# Patient Record
Sex: FEMALE | Race: WHITE | NOT HISPANIC OR LATINO
[De-identification: names, ages, dates, MRNs, and addresses within clinical notes are randomized per-mention and may not be internally consistent; named-entity substitution may affect disease eponyms.]

---

## 2022-11-15 PROBLEM — Z00.00 ENCOUNTER FOR PREVENTIVE HEALTH EXAMINATION: Status: ACTIVE | Noted: 2022-11-15

## 2022-11-16 ENCOUNTER — APPOINTMENT (OUTPATIENT)
Dept: ORTHOPEDIC SURGERY | Facility: CLINIC | Age: 57
End: 2022-11-16

## 2022-11-16 VITALS — HEIGHT: 64 IN | WEIGHT: 190 LBS | BODY MASS INDEX: 32.44 KG/M2

## 2022-11-16 DIAGNOSIS — Z78.9 OTHER SPECIFIED HEALTH STATUS: ICD-10-CM

## 2022-11-16 DIAGNOSIS — Z72.3 LACK OF PHYSICAL EXERCISE: ICD-10-CM

## 2022-11-16 DIAGNOSIS — Z60.2 PROBLEMS RELATED TO LIVING ALONE: ICD-10-CM

## 2022-11-16 PROCEDURE — 99203 OFFICE O/P NEW LOW 30 MIN: CPT | Mod: 25

## 2022-11-16 PROCEDURE — 73564 X-RAY EXAM KNEE 4 OR MORE: CPT | Mod: RT

## 2022-11-16 PROCEDURE — 20610 DRAIN/INJ JOINT/BURSA W/O US: CPT | Mod: 59,RT

## 2022-11-16 RX ORDER — AMOXICILLIN 875 MG/1
875 TABLET, FILM COATED ORAL
Qty: 14 | Refills: 0 | Status: COMPLETED | COMMUNITY
Start: 2022-09-27

## 2022-11-16 RX ORDER — IBUPROFEN 800 MG/1
800 TABLET, FILM COATED ORAL
Qty: 21 | Refills: 0 | Status: COMPLETED | COMMUNITY
Start: 2022-09-27

## 2022-11-16 RX ORDER — BENZONATATE 200 MG/1
200 CAPSULE ORAL
Qty: 30 | Refills: 0 | Status: COMPLETED | COMMUNITY
Start: 2022-11-01

## 2022-11-16 RX ORDER — AZITHROMYCIN 250 MG/1
250 TABLET, FILM COATED ORAL
Qty: 6 | Refills: 0 | Status: COMPLETED | COMMUNITY
Start: 2022-11-01

## 2022-11-16 SDOH — SOCIAL STABILITY - SOCIAL INSECURITY: PROBLEMS RELATED TO LIVING ALONE: Z60.2

## 2022-11-16 NOTE — PHYSICAL EXAM
[LE] : Sensory: Intact in bilateral lower extremities [Normal RLE] : Right Lower Extremity: No scars, rashes, lesions, ulcers, skin intact [Normal LLE] : Left Lower Extremity: No scars, rashes, lesions, ulcers, skin intact [Normal Touch] : sensation intact for touch [Normal] : Oriented to person, place, and time, insight and judgement were intact and the affect was normal [Slightly Antalgic] : slightly antalgic [de-identified] : RIGHT  knee is left for comparison\par Slightly antalgic gait.\par It hurts if she extends her knee fully.\par Trace effusion right knee.  No erythema or ecchymoses.\par Right knee range of motion is with a few degree flexion contracture with pain on attempted full extension and flexion to about 130 degrees with pain.  On the left there is motion from 0 to 135 degrees.\par Tender right knee medial joint line and mildly patella facet.\par Ia Lachman.\par Positive Diaz.\par Negative anterior and posterior drawer.  Normal varus and valgus laxity.\par Normal neurovascular exam [de-identified] : \par \par X-rays taken today of RIGHT knee weightbearing 4 views show mild medial joint space narrowing and small osteophytes consistent with mild to moderate medial compartment arthrosis and mild degenerative changes patellofemoral joint.  KL 2

## 2022-11-16 NOTE — REVIEW OF SYSTEMS
[Negative] : Heme/Lymph [Joint Pain] : joint pain [Joint Stiffness] : joint stiffness [Feeling Tired] : fatigue [Recent Weight Gain (___ Lbs)] : recent ~M [unfilled] lbs weight gain [Anxiety] : anxiety [Muscle Weakness] : muscle weakness

## 2022-11-16 NOTE — HISTORY OF PRESENT ILLNESS
[de-identified] : Ms. Bradley is a 56 y/o woman who comes in for evaluation for RIGHT knee pain that started a few years ago when she kneeled on her bed and felt a painless snap in the back of her knee. She has had some ongoing swelling in back of knee but was never very painful until a couple days ago. She has pain with full extension and cant get it all the way straight when walking. She has been icing. Her pain is located in the back of her knee and it can radiate down into her calf. 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 \par She has a history of LEFT meniscus surgery 14 years ago.  The left knee has done well without any problems.

## 2022-11-16 NOTE — PROCEDURE
[Injection] : Injection [Right] : of the right [Knee Joint] : knee joint [Effusion] : Effusion [Osteoarthritis] : Osteoarthritis [Patient] : patient [Risk] : risk [Benefits] : benefits [Alternatives] : alternatives [Bleeding] : bleeding [Infection] : infection [Allergic Reaction] : allergic reaction [Verbal Consent Obtained] : verbal consent was obtained prior to the procedure [Alcohol] : Alcohol [Ethyl Chloride Spray] : ethyl chloride spray was used as a topical anesthetic [Lateral] : lateral [20] : a 20-gauge [Same Needle/New Syringe] : the syringe was changed and the same needle was left in place and [1% Lidocaine___(mL)] : [unfilled] mL of 1% Lidocaine [Triamcinolone 40mg/mL___(mL)] : [unfilled] ~UmL of 40mg/mL triamcinolone [Bandage Applied] : a bandage [Tolerated Well] : The patient tolerated the procedure well [None] : none [No Strenuous Activity___day(s)] : avoid strenuous activity for [unfilled] day(s) [PRN] : as needed [FreeTextEntry1] : ChloraPrep

## 2022-11-16 NOTE — ASSESSMENT
[FreeTextEntry1] : 56 y/o female with RIGHT knee pain consistent with medial compartment osteoarthritis and likely has a degenerative medial meniscus tear given the acute pain.  We discussed the diagnosis and treatment.  Recommended taking an anti-inflammatory.  Ibuprofen was prescribed that she can take 1 or 2 times a day and we decided to do a steroid injection because she was hoping to get more immediate relief since this is her busy season with work.\par She will do home exercises and physical therapy.\par If its not better in the next 6 to 8 weeks she will come in for follow-up or follow-up as needed.  If it continues to hurt we may get an MRI or work-up further.  If there was a significant meniscus tear causing pain then arthroscopy may be considered but often degenerative tears with arthritis can be managed without surgery.  For the arthritis we may also consider hyaluronic acid injection.

## 2023-02-03 ENCOUNTER — APPOINTMENT (OUTPATIENT)
Dept: ORTHOPEDIC SURGERY | Facility: CLINIC | Age: 58
End: 2023-02-03
Payer: COMMERCIAL

## 2023-02-03 PROCEDURE — 20610 DRAIN/INJ JOINT/BURSA W/O US: CPT | Mod: 59,RT

## 2023-02-03 PROCEDURE — 99214 OFFICE O/P EST MOD 30 MIN: CPT | Mod: 25

## 2023-02-03 NOTE — HISTORY OF PRESENT ILLNESS
[de-identified] : Ms. Bradley is a 56 y/o woman who comes in for f/u evaluation for RIGHT knee pain that started a few years ago when she kneeled on her bed and felt a painless snap in the back of her knee. She had the MRI done and comes in to review today.  She states that her knee is feeling tight and sore.  She never went to physical therapy but did some home exercises.  She is on her feet all day.  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  Epsom salt baths are helpful.  She does rest and ice as well.  She takes ibuprofen intermittently.

## 2023-02-03 NOTE — PHYSICAL EXAM
[Slightly Antalgic] : slightly antalgic [LE] : Sensory: Intact in bilateral lower extremities [Normal RLE] : Right Lower Extremity: No scars, rashes, lesions, ulcers, skin intact [Normal LLE] : Left Lower Extremity: No scars, rashes, lesions, ulcers, skin intact [Normal Touch] : sensation intact for touch [Normal] : Oriented to person, place, and time, insight and judgement were intact and the affect was normal [de-identified] : RIGHT  knee is left for comparison\par Slightly antalgic gait.\par It hurts if she extends her knee fully.  She cannot fully extend the knee\par Trace effusion right knee.  No erythema or ecchymoses.\par 1+ effusion right knee.  Posteriorly there is a palpable Baker's cyst\par Right knee range of motion is with a 5 degree flexion contracture with pain on attempted full extension and flexion to about 130 degrees with pain.  On the left there is motion from 0 to 135 degrees.\par Tender right knee medial joint line and mildly patella facet.\par Ia Lachman.\par Positive Diaz.\par Negative anterior and posterior drawer.  Normal varus and valgus laxity.\par Normal neurovascular exam [de-identified] : \par 11/16/2022 x-rays of RIGHT knee weightbearing 4 views show mild medial joint space narrowing and small osteophytes consistent with mild to moderate medial compartment arthrosis and mild degenerative changes patellofemoral joint.  KL 2\par \par MRI of the right knee performed at [facility] on 1/31/2023 shows large Baker's cyst, chondral wear medial compartment and extrusion of the degenerative medial meniscus and bone bruising likely degenerative in the far medial tibial plateau

## 2023-02-03 NOTE — PROCEDURE
[Aspiration] : Aspiration [Injection] : Injection [Right] : of the right [Knee Joint] : knee joint [Patient] : patient [Risk] : risk [Benefits] : benefits [Alternatives] : alternatives [Bleeding] : bleeding [Infection] : infection [Allergic Reaction] : allergic reaction [Verbal Consent Obtained] : verbal consent was obtained prior to the procedure [Alcohol] : Alcohol [Ethyl Chloride Spray] : ethyl chloride spray was used as a topical anesthetic [Same Needle/New Syringe] : the syringe was changed and the same needle was left in place and [Bandage Applied] : a bandage [Tolerated Well] : The patient tolerated the procedure well [None] : none [No Strenuous Activity___day(s)] : avoid strenuous activity for [unfilled] day(s) [___ mL Fluid] : [unfilled] mL of [Yellow] : yellow [Clear] : clear [Triamcinolone 40mg/mL___(mL)] : [unfilled] ~UmL of 40mg/mL triamcinolone [Inflammation] : Inflammation [Joint Pain] : Joint pain [20] : a 20-gauge [PRN] : as needed [de-identified] : Baker's cyst [FreeTextEntry1] : ChloraPrep [de-identified] : Lateral to the patella for the aspiration of the knee and posteriorly directly over the Baker's cyst [de-identified] : Each site [FreeTextEntry9] : Posteriorly and Baker's cyst

## 2023-02-03 NOTE — ASSESSMENT
[FreeTextEntry1] : 56 y/o female with RIGHT knee pain consistent with moderately severe medial compartment osteoarthritis and a degenerative medial meniscus tear.  With the degree of arthritis and swelling I would not recommend arthroscopy at this point.  We talked about trying an aspiration of the knee joint and then aspirating the Baker's cyst and doing a steroid injection in the cyst.  Her pain is more posterior.  Following the aspirations her knee felt significantly better with less pressure and stiffness.\par We will see if it gives her longer term relief.  She can take ibuprofen and ice in the meantime.  She will go to physical therapy and do home exercises.\par Follow-up in about 6 weeks to check on her progress.\par The arthritis typically will gradually get worse over time and at some point in the future could be bad enough that she would consider doing knee replacement surgery

## 2023-03-08 ENCOUNTER — APPOINTMENT (OUTPATIENT)
Dept: ORTHOPEDIC SURGERY | Facility: CLINIC | Age: 58
End: 2023-03-08
Payer: SELF-PAY

## 2023-03-08 DIAGNOSIS — M71.21 SYNOVIAL CYST OF POPLITEAL SPACE [BAKER], RIGHT KNEE: ICD-10-CM

## 2023-03-08 PROCEDURE — 99214 OFFICE O/P EST MOD 30 MIN: CPT

## 2023-03-08 RX ORDER — HYALURONATE SODIUM, STABILIZED 88 MG/4 ML
88 SYRINGE (ML) INTRAARTICULAR
Qty: 1 | Refills: 0 | Status: ACTIVE | OUTPATIENT
Start: 2023-03-08

## 2023-03-08 NOTE — PHYSICAL EXAM
[Slightly Antalgic] : slightly antalgic [LE] : Sensory: Intact in bilateral lower extremities [Normal RLE] : Right Lower Extremity: No scars, rashes, lesions, ulcers, skin intact [Normal LLE] : Left Lower Extremity: No scars, rashes, lesions, ulcers, skin intact [Normal Touch] : sensation intact for touch [Normal] : Oriented to person, place, and time, insight and judgement were intact and the affect was normal [de-identified] : RIGHT  knee is left for comparison\par Slightly antalgic gait.\par Trace effusion right knee.  No erythema or ecchymoses.\par No palpable cyst posterior knee\par Right knee range of motion is with a 5 degree flexion contracture with pain on attempted full extension and flexion to about 130 degrees with pain.  On the left there is motion from 0 to 135 degrees.\par Tender right knee medial joint line and mildly patella facet.\par Ia Lachman.\par Positive Diaz.\par Negative anterior and posterior drawer.  Normal varus and valgus laxity.\par Normal neurovascular exam [de-identified] : \par 11/16/2022 x-rays of RIGHT knee weightbearing 4 views show mild medial joint space narrowing and small osteophytes consistent with mild to moderate medial compartment arthrosis and mild degenerative changes patellofemoral joint.  KL 2\par \par MRI of the right knee performed at [facility] on 1/31/2023 shows large Baker's cyst, chondral wear medial compartment and extrusion of the degenerative medial meniscus and bone bruising likely degenerative in the far medial tibial plateau

## 2023-03-08 NOTE — HISTORY OF PRESENT ILLNESS
[de-identified] : Ms. Bradley is a 58 y/o woman who comes in for f/u evaluation for RIGHT knee pain from osteoarthritis.\par We had done a steroid injection which partially helped but she still has some pain and stiffness and difficulty with deep bending.  She is walking with less pain and the swelling will decrease but did not resolve.  She has been taking the 600 mg ibuprofen and ran out of that.  She has been doing a home exercise program for the knee that we had given her in November 4 months ago consistently.  She has not been able to find a physical therapy facility that takes her insurance but may go out of network instead to do some formal therapy but has been good about doing her exercises.

## 2023-03-08 NOTE — ASSESSMENT
[FreeTextEntry1] : 56 y/o female with RIGHT knee pain consistent with moderately severe medial compartment osteoarthritis and a degenerative medial meniscus tear.  There is still small effusion in the knee but has decreased and steroid injection was partially helpful.  She has been doing the exercises and taking the NSAIDs for months now.  Given the ongoing pain we talked about hyaluronic acid injection and I put in a prescription to try to get Monovisc which is one of the brands.  We talked about the pros and cons and she would like to try that.  She will continue with all of the other treatment including NSAIDs as needed but avoiding too much which could affect her kidneys or cause an ulcer since she has been on it for many months now.  She will continue with the home exercises for the knee.  Follow-up for the hyaluronic acid injection when we get it.

## 2023-03-09 RX ORDER — IBUPROFEN 600 MG/1
600 TABLET, FILM COATED ORAL TWICE DAILY
Qty: 60 | Refills: 0 | Status: ACTIVE | COMMUNITY
Start: 2022-11-16 | End: 1900-01-01

## 2023-04-04 ENCOUNTER — NON-APPOINTMENT (OUTPATIENT)
Age: 58
End: 2023-04-04

## 2023-06-05 ENCOUNTER — APPOINTMENT (OUTPATIENT)
Dept: ORTHOPEDIC SURGERY | Facility: CLINIC | Age: 58
End: 2023-06-05

## 2023-06-07 ENCOUNTER — RX RENEWAL (OUTPATIENT)
Age: 58
End: 2023-06-07

## 2023-06-07 ENCOUNTER — OUTPATIENT (OUTPATIENT)
Dept: OUTPATIENT SERVICES | Facility: HOSPITAL | Age: 58
LOS: 1 days | End: 2023-06-07
Payer: MEDICAID

## 2023-06-07 ENCOUNTER — RESULT REVIEW (OUTPATIENT)
Age: 58
End: 2023-06-07

## 2023-06-07 ENCOUNTER — APPOINTMENT (OUTPATIENT)
Dept: ORTHOPEDIC SURGERY | Facility: CLINIC | Age: 58
End: 2023-06-07
Payer: MEDICAID

## 2023-06-07 VITALS
HEIGHT: 64 IN | SYSTOLIC BLOOD PRESSURE: 143 MMHG | OXYGEN SATURATION: 97 % | BODY MASS INDEX: 30.73 KG/M2 | DIASTOLIC BLOOD PRESSURE: 79 MMHG | WEIGHT: 180 LBS | HEART RATE: 62 BPM

## 2023-06-07 DIAGNOSIS — S83.241A OTHER TEAR OF MEDIAL MENISCUS, CURRENT INJURY, RIGHT KNEE, INITIAL ENCOUNTER: ICD-10-CM

## 2023-06-07 DIAGNOSIS — M17.11 UNILATERAL PRIMARY OSTEOARTHRITIS, RIGHT KNEE: ICD-10-CM

## 2023-06-07 DIAGNOSIS — Z82.61 FAMILY HISTORY OF ARTHRITIS: ICD-10-CM

## 2023-06-07 DIAGNOSIS — Z87.39 PERSONAL HISTORY OF OTHER DISEASES OF THE MUSCULOSKELETAL SYSTEM AND CONNECTIVE TISSUE: ICD-10-CM

## 2023-06-07 DIAGNOSIS — M25.461 EFFUSION, RIGHT KNEE: ICD-10-CM

## 2023-06-07 PROCEDURE — 72170 X-RAY EXAM OF PELVIS: CPT | Mod: 26

## 2023-06-07 PROCEDURE — 99214 OFFICE O/P EST MOD 30 MIN: CPT | Mod: 25

## 2023-06-07 PROCEDURE — 20610 DRAIN/INJ JOINT/BURSA W/O US: CPT

## 2023-06-07 PROCEDURE — 73564 X-RAY EXAM KNEE 4 OR MORE: CPT

## 2023-06-07 PROCEDURE — 72170 X-RAY EXAM OF PELVIS: CPT

## 2023-06-07 PROCEDURE — 73564 X-RAY EXAM KNEE 4 OR MORE: CPT | Mod: 26,50

## 2023-06-07 RX ORDER — HYALURONATE SODIUM 20 MG/2 ML
20 SYRINGE (ML) INTRAARTICULAR
Qty: 1 | Refills: 0 | Status: ACTIVE | OUTPATIENT
Start: 2023-06-07

## 2023-06-07 NOTE — REVIEW OF SYSTEMS
[Joint Pain] : joint pain [Joint Stiffness] : joint stiffness [Joint Swelling] : joint swelling [Feeling Tired] : fatigue [Recent Weight Gain (___ Lbs)] : recent ~M [unfilled] lbs weight gain [Heartburn] : heartburn [Depression] : depression [Feeling Weak] : feeling weak [Negative] : Heme/Lymph

## 2023-06-08 PROBLEM — Z87.39 HISTORY OF OSTEOPOROSIS: Status: RESOLVED | Noted: 2023-06-07 | Resolved: 2023-06-08

## 2023-06-08 PROBLEM — Z82.61 FAMILY HISTORY OF ARTHRITIS: Status: ACTIVE | Noted: 2023-06-07

## 2023-06-08 PROBLEM — Z87.39 HISTORY OF ARTHRITIS: Status: RESOLVED | Noted: 2023-06-07 | Resolved: 2023-06-08

## 2023-06-08 RX ORDER — SERTRALINE HYDROCHLORIDE 25 MG/1
TABLET, FILM COATED ORAL
Refills: 0 | Status: ACTIVE | COMMUNITY

## 2023-06-08 NOTE — DISCUSSION/SUMMARY
[de-identified] : 56 y/o female with right greater than left knee OA. \par - We discussed the diagnosis, natural history, and treatment options ranging from fully conservative to invasive. Regarding surgical options, I think that she would be a reasonable candidate for both total knee replacement or medial unicompartmental knee replacement.\par - The relative merits of partial and total knee replacement were discussed in detail. The patient was informed that partial knee replacement preserves bone and cartilage as compared to total knee replacement and maintains the function of the anterior cruciate ligament. In appropriately selected patients, partial knee replacement can result in a faster, less painful recovery and a more "normal" feel to the knee with improved function in deep flexion activities. The patient was also informed that the published 10 year reoperation rate is somewhat higher for partial knee replacement, with fixation failure, progression of disease affecting un-resurfaced portions of the joint, polyethylene wear and unexplained pain being the most common causes of revision surgery. The patient was informed that conversion of partial knee replacement to total knee replacement is typically more complex than primary total knee replacement but less complex and invasive than revision of total knee replacement. We discussed the details of both procedures, the expected recovery periods, and the expected outcomes. We discussed the likelihood of satisfaction after complete recovery, and the potential causes of dissatisfaction. The importance of active patient participation in the rehabilitation protocol was emphasized, along with its influence on short and long-term outcomes.  We discussed the possibility that intra-operative findings would dictate conversion to total knee replacement, and the influence that change would have on recovery and long term outcome. Specific risks of partial and total knee replacement were discussed in detail. We discussed the risk of surgical site complications including but not limited to: surgical site infection, wound healing complications, bone fracture, tendon or ligament injury, neurovascular injury, hemorrhage, postoperative stiffness or instability, persistent pain and need for reoperation or manipulation under anesthesia. We discussed surgical blood loss and the small risk of requiring blood transfusion. We discussed the risk of perioperative medical complications, including but not limited to catheter-associated urinary tract infection, venous thromboembolism and other cardiopulmonary complications. We discussed anesthetic options and the small risk of anesthesia-related complications. We discussed the durability of knee replacements and limitations related to progression of arthritis in remaining compartments, polyethylene wear, osteolysis, loosening and unexplained pain. The role of the robot was discussed with respect to bone preparation and ligament balancing.\par - After full discussion, I think that it is also reasonable for her to continue with nonsurgical care given the current relatively mild level of her symptoms, with which she agrees. We will continue nonsurgically for now. \par - She was given a referral to outpatient PT as well as a HEP. I recommended that she use Tylenol and meloxicam as needed. I administered a right knee aspiration and injection today and we will attempt to gain authorization for right knee Euflexxa via the Euflexxa assistance program to be administered once authorized. \par - If the above measures are effective, then we can continue with serial injections for as long as they remain effective. If not, then we can revisit the topic regarding right knee arthroplasty in the future. \par - We also had a specific discussion regarding the Baker's cyst. I explained that the cyst appears to be multiloculated and already partially ruptured on the January MRI and furthermore, I noted that Baker's cysts typically do not generate symptoms themselves except in the case of a rupture which has already happened. I recommended against any Baker's cyst aspiration and against any operative excision. The patient verbalized understanding and agreement.

## 2023-06-08 NOTE — ADDENDUM
[FreeTextEntry1] : Documented by Sarah Owens acting as a scribe for Dr. Yair Padilla on 06/07/2023.

## 2023-06-08 NOTE — PHYSICAL EXAM
[de-identified] :  General appearance: well nourished and hydrated, pleasant, alert and oriented x 3, cooperative.  \par HEENT: normocephalic, EOM intact, wearing mask, external auditory canal clear.  \par Cardiovascular: no lower leg edema, no varicosities, dorsalis pedis pulses palpable and symmetric.  \par Lymphatics: no palpable lymphadenopathy, no lymphedema.  \par Neurologic: sensation is normal, no muscle weakness in upper or lower extremities, patella tendon reflexes present and symmetric.  \par Dermatologic: skin moist, warm, no rash.  \par Spine: cervical spine with normal lordosis and painless range of motion, thoracic spine with normal kyphosis and painless range of motion, lumbosacral spine with normal lordosis and painless range of motion.  No tenderness to palpation along midline spine and paraspinal musculature.  Sacroiliac joints nontender bilaterally. Negative SLR and crossed SLR tests bilaterally.\par Gait: no assistive device, stable nonantalgic gait pattern \par \par Left knee: \par - Focal soft tissue swelling: none\par - Ecchymosis: none\par - Erythema: none\par - Effusion: none, no Baker's cyst\par - Wounds: healed arthroscopic portals\par - Alignment: normal\par - Tenderness: none to medial or lateral joint lines, negative patellar compression test\par - ROM: 0-140\par - Collateral laxity: none\par - Cruciate laxity: none\par - Popliteal angle (degrees): 30\par - Quad strength: 5/5\par \par Right knee:\par - Focal soft tissue swelling: none\par - Ecchymosis: none\par - Erythema: none\par - Effusion: small, small Baker's cyst\par - Wounds: none\par - Alignment: normal\par - Tenderness: TTP along the lateral joint line and medial joint line, negative patellar compression test\par - ROM: 0-135 with pain on terminal flexion\par - Collateral laxity: none\par - Cruciate laxity: none\par - Popliteal angle (degrees): 30\par - Quad strength: 5/5 [de-identified] : AP pelvis and 4 views of the bilateral knees (weightbearing AP, weightbearing Peña, weightbearing lateral, and Sunrise) were interpreted by me and reviewed with the patient.\par \par Location of imaging: [facility] \par Date of exam: 06/07/2023\par \par Pelvic alignment: normal\par \par Right hip -- \par Arthritis: none\par Deformity: none\par Osteonecrosis: none\par \par Left hip -- \par Arthritis: none\par Deformity: none\par Osteonecrosis: none\par \par Right knee -- \par Alignment: normal\par Arthritis: severe isolated medial compartment OA with bone on bone articulation, KL 4\par Patellar height: normal\par Patellar tracking: central\par \par Left knee -- \par Alignment: normal\par Arthritis: mild medial compartment joint space narrowing with associated osteophyte formation, KL 2\par Patellar height: normal\par Patellar tracking: central\par \par I also reviewed a right knee MRI taken at [facility] on 1/21/23, the results of which are significant for intact ACL and PCL. Tricompartmental OA most pronounced medially with associated complex medial meniscal tear is present with intact lateral meniscus, small effusion, and a multiloculated Baker's cyst with evidence of partial rupture.

## 2023-06-08 NOTE — PROCEDURE
[de-identified] : Procedure: Knee joint aspiration/injection\par Laterality: right\par Indication: Osteoarthritis - discussed with patient\par Skin prep: alcohol and chlorhexidine\par Anesthesia: ethyl chloride spray\par Needle: 18-gauge\par Portal: superolateral\par Aspiration: 0 cc (dry tap)\par Injectate: 2cc of 2% lidocaine, 2cc of 0.5% bupivacaine, and 1cc of 40mg/mL triamcinolone\par Dressing: Band-aid\par Complications: None

## 2023-06-08 NOTE — HISTORY OF PRESENT ILLNESS
[4] : a current pain level of 4/10 [Constant] : ~He/She~ states the symptoms seem to be constant [Walking] : worsened by walking [Knee Flexion] : worsened with knee flexion [Knee Extension] : worsened with knee extension [NSAIDs] : relieved by nonsteroidal anti-inflammatory drugs [de-identified] : 06/07/2023: 58 y/o female referred by Dr. Bailey for evaluation of right knee OA. She reports a history of approximately 5-6 months of progressively worsening right knee pain after a relatively minor injury. She reports primarily medial joint line pain with a lesser focus of suprapatellar pain. Symptoms are exacerbated by any weightbearing and ambulation but particularly by ascending and descending stairs as well as squatting and kneeling. She denies any limitations or ambulatory tolerance and ambulates without an assistive device. 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 She notes that since she left that position, symptoms have been somewhat better, but she complains of inability to perform basic activities such as managing stairs and kneeling. She takes Advil daily as needed. Other conservative management measures attempted in the past include acupuncture, right knee aspiration and CSI by Dr. Bailey in February 2023, and PT at various time points. She has a history of left knee arthroscopy for a meniscal tear in 2006 by Dr. Bailey. She denies prior surgeries to the right knee.  [de-identified] : pt states pain is sharp

## 2023-06-08 NOTE — END OF VISIT
[FreeTextEntry3] : All medical record entries made by the Scribe were at my, Dr. Yair Padilla, direction and personally dictated by me on 06/07/2023. I have reviewed the chart and agree that the record accurately reflects my personal performance of the history, physical exam, assessment and plan. I have also personally directed, reviewed, and agreed with the chart.

## 2023-07-12 ENCOUNTER — APPOINTMENT (OUTPATIENT)
Dept: ORTHOPEDIC SURGERY | Facility: CLINIC | Age: 58
End: 2023-07-12
Payer: MEDICAID

## 2023-07-12 VITALS
BODY MASS INDEX: 30.73 KG/M2 | DIASTOLIC BLOOD PRESSURE: 85 MMHG | OXYGEN SATURATION: 96 % | SYSTOLIC BLOOD PRESSURE: 134 MMHG | HEIGHT: 64 IN | HEART RATE: 80 BPM | WEIGHT: 180 LBS

## 2023-07-12 PROCEDURE — 20610 DRAIN/INJ JOINT/BURSA W/O US: CPT

## 2023-07-14 NOTE — PROCEDURE
[de-identified] : EUFLEXXA INJECTION - RIGHT KNEE JOINT  1 OF 3 \par LOT - [id number]\par EXP- 2024-05-14\par MAN - GEOFF \par NDC- [address]-6\par \par Procedure: Knee joint injection\par Laterality: Right\par Indication: Osteoarthritis - discussed with patient\par Skin prep: alcohol and chlorhexidine\par Anesthesia: ethyl chloride spray\par Needle: 20-gauge\par Portal: inferolateral\par Aspiration: none\par Injectate: Euflexxa #1 of 3\par Dressing: Band-aid\par Complications: None\par \par RTC next week for Euflexxa #2 of 3 to the right knee.

## 2023-07-19 ENCOUNTER — APPOINTMENT (OUTPATIENT)
Dept: ORTHOPEDIC SURGERY | Facility: CLINIC | Age: 58
End: 2023-07-19
Payer: MEDICAID

## 2023-07-19 VITALS
OXYGEN SATURATION: 93 % | HEIGHT: 64 IN | BODY MASS INDEX: 30.73 KG/M2 | WEIGHT: 180 LBS | HEART RATE: 75 BPM | SYSTOLIC BLOOD PRESSURE: 146 MMHG | DIASTOLIC BLOOD PRESSURE: 92 MMHG

## 2023-07-19 PROCEDURE — 20610 DRAIN/INJ JOINT/BURSA W/O US: CPT | Mod: 50

## 2023-07-20 NOTE — PROCEDURE
[de-identified] : Procedure: Knee joint injection\par Laterality: Right\par Indication: Osteoarthritis - discussed with patient\par Skin prep: alcohol and chlorhexidine\par Anesthesia: ethyl chloride spray\par Needle: 20-gauge\par Portal: inferolateral\par Aspiration: none\par Injectate: Euflexxa #2 of 3\par Dressing: Band-aid\par Complications: None\par \par EUFLEXXA INJECTION - RIGHT KNEE JOINT 2 OF 3 \par LOT - [id number]\par EXP- 2024-05-14\par MAN - FERRING \par NDC- [address]-1\par \par RTC next week for Euflexxa #3 of 3 to the right knee.

## 2023-07-20 NOTE — PROCEDURE
[de-identified] : Procedure: Knee joint injection\par Laterality: Right\par Indication: Osteoarthritis - discussed with patient\par Skin prep: alcohol and chlorhexidine\par Anesthesia: ethyl chloride spray\par Needle: 20-gauge\par Portal: inferolateral\par Aspiration: none\par Injectate: Euflexxa #2 of 3\par Dressing: Band-aid\par Complications: None\par \par EUFLEXXA INJECTION - RIGHT KNEE JOINT 2 OF 3 \par LOT - [id number]\par EXP- 2024-05-14\par MAN - FERRING \par NDC- [address]-3\par \par RTC next week for Euflexxa #3 of 3 to the right knee.

## 2023-07-26 ENCOUNTER — APPOINTMENT (OUTPATIENT)
Dept: ORTHOPEDIC SURGERY | Facility: CLINIC | Age: 58
End: 2023-07-26
Payer: MEDICAID

## 2023-07-26 VITALS
BODY MASS INDEX: 30.73 KG/M2 | DIASTOLIC BLOOD PRESSURE: 85 MMHG | OXYGEN SATURATION: 97 % | WEIGHT: 180 LBS | SYSTOLIC BLOOD PRESSURE: 127 MMHG | HEART RATE: 95 BPM | HEIGHT: 64 IN

## 2023-07-26 PROCEDURE — 20610 DRAIN/INJ JOINT/BURSA W/O US: CPT | Mod: RT

## 2023-07-27 NOTE — PROCEDURE
[de-identified] : EUFLEXXA INJECTION - RIGHT KNEE JOINT 3 OF 3 \par LOT - [id number]\par EXP- 2024-05-14\par MAN - GEOFF \par NDC- [address]-6\par \par Procedure: Knee joint injection\par Laterality: RIGHT\par Indication: Osteoarthritis - discussed with patient\par Skin prep: alcohol and chlorhexidine\par Anesthesia: ethyl chloride spray\par Needle: 20-gauge\par Portal: inferolateral\par Aspiration: none\par Injectate: Euflexxa 3/3\par Dressing: Band-aid\par Complications: None\par \par - Advised patient to call the office in 5 months to resubmit authorization for right knee Euflexxa injections.\par - PT script provided\par - Pain Management referral for left sacroiliitis

## 2023-08-08 ENCOUNTER — RESULT REVIEW (OUTPATIENT)
Age: 58
End: 2023-08-08

## 2023-08-08 ENCOUNTER — APPOINTMENT (OUTPATIENT)
Dept: PHYSICAL MEDICINE AND REHAB | Facility: CLINIC | Age: 58
End: 2023-08-08
Payer: MEDICAID

## 2023-08-08 VITALS
OXYGEN SATURATION: 95 % | BODY MASS INDEX: 30.73 KG/M2 | DIASTOLIC BLOOD PRESSURE: 85 MMHG | HEART RATE: 71 BPM | SYSTOLIC BLOOD PRESSURE: 124 MMHG | WEIGHT: 180 LBS | HEIGHT: 64 IN

## 2023-08-08 PROCEDURE — 99204 OFFICE O/P NEW MOD 45 MIN: CPT

## 2023-08-09 NOTE — PHYSICAL EXAM
[FreeTextEntry1] : Gen: A+O x 3 in NAD Psych: labile ood and affect. Responds appropriately to commands. tearful at times Eyes: Anicteric. No discharge. EOMI. Resp: Breathing unlabored CV: DP pulses 2+ and equal. No varicosities noted Ext: No c/c/e Skin: No lesions noted  Gait: Non antalgic  +  reciprocating heel to toe able to stand on toes and heels WITH hand holding Tandem gait intact WITH hand holding Poor/Fair single leg standing balance, left Romberg negative  Neg Trendelenburg sign with single leg stance  Inspection: Spine alignment is midline. Palpation: There is + tenderness over the midline spinous processes, paravertebral muscles of the thoracolumbar region Lumbar ROM: Flexion, extension, side-bending, rotation, limited in most planes to the right >> left  +pain with lateral flexion +pain with oblique extension +pain with lateral rotation   Hip ROM: neg pain at terminal ROM bilaterally. FAIR, FABERE negative bilaterally.  	Hip Flex       Knee Ext      Ankle Dorsi           EHL        Ankle Plantar Right	4/5	        5/5	                 5/5	          5/5	             5/5                            Left	        4/5	        5/5	                 5/5	          5/5	             5/5                             Hip abduction R 4+/5 L4 /5 Hip adduction R 4+/5 L 4/5 Hip extension R 4+/5 L 4/5 Knee Flexion R 4+/5 L4 /5  Tone: Normal. No clonus. Sensation: Grossly intact to light touch bilateral lower limbs. Proprioception: Intact at big toes bilaterally. Reflexes: 2+ symmetric knee jerk, ankle jerk.  Plantars downgoing bilaterally.  SLR negative bilaterally Crossed SLR negative bilaterally. Slump Test negative bilaterally  prone gapping test + L>R yeoman + L>R nachlas + L>R active hip extension was more difficult on the left, knee extended more difficult

## 2023-08-09 NOTE — HISTORY OF PRESENT ILLNESS
[FreeTextEntry1] : Jacob Santos M.D. Sports Medicine and Interventional Spine Department of Physical Medicine and Rehabilitation  [facility] Orthopaedic [facility] [address] [place] [address]  [facility]opa[facility] [place] at [facility] [address] [place] [address]  [facility] at  formerly [facility] [address], [address] [place] [address]  For [facility] Appointments Phone: [phone] Fax: [phone]  ----------------------------------------------------------------------------------------------------------------------------------------  PATIENT: FRANKIE NESBITT  MRN: [id number]  YOB: 1965  DATE OF SERVICE: 08/08/2023   Aug 08, 2023   Dear Elsie  Thank you for referring FRANKIE NESBITT to my Sports and Interventional Spine practice and office. Enclosed is a copy of the patient's consultation/progress note, which includes my complete assessment and recent studies completed during the patient's evaluation.  If you have questions or have any patients who require nonsurgical, non-opiate management of any sports, spine, or musculoskeletal conditions, please do not hesitate to contact my , Su [place]o at [phone].  I look forward to taking care of your patients along with you.  Sincerely,  Jacob Santos MD Sports, Interventional Spine, & Regenerative Musculoskeletal Medicine Orthopaedic [place] at [facility]                                                     Initial Consultation: CC: left leg pain  HPI:  This is the first visit to [facility]opa[facility] Interventional Spine Practice.    FRANKIE NESBITT presents with the chief complaint as above.    Initial Hx on 08/08/2023 : Presents in person to [place] patient with left low back, left gluteal pain that extends into the left lower limb, crossing the knee into the lower limb in L5, S1 distribution pain has disrupted her usual activities, so she has been using advil PRN, provides modest relief (70-80% better with meds) The patients difficulties began years ago, has recently been more symptomatic prior to presentation has not yet enrolled in a physical therapy program unsure of the benefit at this time The pain is graded as moderate to intermittently severe The pain is described as burning pain at times The pain is intermittent The pain radiates in the LEFT LOWER limbs in a L5, S1 distribution pain is worse during bridge movements, will usually make her pain worse The patient feels that the pain is overall persistent  Patient denies other recent fall, MVA, injury, trauma, or accident besides presenting history above  Aggravating: hip bridges, every day exertion, at its worst there is "zero position thats good for it" Alleviating: rest, activity modification, pharmacologic treatments  Meds: denies regular PO pain medications, as above otherwise Therapy Program: no recent structured targeted therapy program HEP: doing HEP regularly  Assoc Sx: Denies numbness, Tingling Denies Focal motor weakness in the upper or lower limbs Denies New or worsened bowel or bladder incontinence Denies Saddle anesthesia Denies Using Orthotic(s)/Supportive devices Denies Swelling in the upper/lower extremities They also deny frequent tripping, falling  ROS: A 14 point review of systems was completed. Positive findings are pain as described above. The remaining systems negative.  Breast Cancer Surveillance: up to date COVID HX: reviewed  Assoc Hx: Ambulates without assistive device Injection Hx: denies locally directed treatment to the area in question Imaging Hx: reviewed  Level of functioning: indep with ambulation, indep with ADLs Living Situation: dwelling with steps to enter

## 2023-08-09 NOTE — ASSESSMENT
[FreeTextEntry1] :                                                       Assessment/Plan:  FRANKIE NESBITT is a 57 year female with left low back pain here for initial consultation.  Gluteal tendinitis, L>R Sacroiliac joint dysfunction, left Gluteus medius tendinopathy, B/L Sprain of sacroiliac joint region, L Chronic lumbar radiculopathy, L5, S1, left Spasm of lumbar paraspinous muscles, B/L  Osteoarthritis of knee, B/L   - Tiers of treatment and management of above diagnosis(es) were discussed with patient - Optimal diet, weight, sleep, and lifestyle management to minimize stress and maximize well being counseling provided - Imaging reviewed and discussed with patient - Reviewed previous encounter notes from 7/26/2023 Dr. JOEL Padilla ([facility])  - Patient was advised to start a structured, targeted therapy program 2-3x/wk for 8 wks with goal toward HEP - Patient was educated on an appropriate home exercise program, provided with exercise recommendations, all questions answered - patient may continue meloxicam per specialists, encouraged less consumption overall - Patient may trial acetaminophen 1000mg up to TID PRN moderate to severe pain and to decrease average consumption of NSAID - Patient may trial topical compound cream to the left posterolateral hip no more than twice per day as needed for next 2-3 weeks, Rx entered via portal, written information provided to the patient in addition to verbal explanation regarding the medication - Patient was advised to apply cool compresses or warm heat to affected regions PRN - Ordered radiographs of the lumbosacral, pelvic region  - Educated about red flag symptoms including (but not limited to) new, worsened, or persistent: fever greater than 100F, bowel or bladder incontinence, bowel obstipation, inability to void urine, urinary leakage, Severe nausea or vomiting, Worsening numbness, worsening tingling/paresthesias, and/or new or progressive motor weakness; advised to seek immediate medical attention at his nearest Emergency department should they experience any of the above  - Follow up in 2-3 weeks after imaging, in person in 2 months to assess their progress  I have personally spent a total of at least 50 minutes preparing, reviewing internal and external records, explaining, counseling, and coordinating care for this patient encounter.  Thank you, Dr(s), for allowing me to participate in the care of your patient. Please do not hesitate to contact me with questions/concerns.  Jacob Santos M.D. Sports and Interventional Spine Department of Physical Medicine and Rehabilitation  [facility] Physician [facility] Orthopaedic [address] Floor [place], [place] [address]  Appointments: [phone] Fax: [phone]

## 2023-08-15 ENCOUNTER — RESULT REVIEW (OUTPATIENT)
Age: 58
End: 2023-08-15

## 2023-08-15 ENCOUNTER — OUTPATIENT (OUTPATIENT)
Dept: OUTPATIENT SERVICES | Facility: HOSPITAL | Age: 58
LOS: 1 days | End: 2023-08-15
Payer: MEDICAID

## 2023-08-15 PROCEDURE — 72110 X-RAY EXAM L-2 SPINE 4/>VWS: CPT | Mod: 26

## 2023-08-15 PROCEDURE — 72170 X-RAY EXAM OF PELVIS: CPT

## 2023-08-15 PROCEDURE — 72110 X-RAY EXAM L-2 SPINE 4/>VWS: CPT

## 2023-08-15 PROCEDURE — 72170 X-RAY EXAM OF PELVIS: CPT | Mod: 26

## 2023-08-22 ENCOUNTER — TRANSCRIPTION ENCOUNTER (OUTPATIENT)
Age: 58
End: 2023-08-22

## 2023-09-28 ENCOUNTER — APPOINTMENT (OUTPATIENT)
Dept: PHYSICAL MEDICINE AND REHAB | Facility: CLINIC | Age: 58
End: 2023-09-28
Payer: MEDICAID

## 2023-09-28 DIAGNOSIS — M46.1 SACROILIITIS, NOT ELSEWHERE CLASSIFIED: ICD-10-CM

## 2023-09-28 PROCEDURE — 99442: CPT

## 2023-10-13 ENCOUNTER — RX RENEWAL (OUTPATIENT)
Age: 58
End: 2023-10-13

## 2023-10-13 RX ORDER — MELOXICAM 15 MG/1
15 TABLET ORAL DAILY
Qty: 30 | Refills: 1 | Status: ACTIVE | COMMUNITY
Start: 2023-06-07 | End: 1900-01-01

## 2023-10-17 ENCOUNTER — APPOINTMENT (OUTPATIENT)
Dept: PHYSICAL MEDICINE AND REHAB | Facility: CLINIC | Age: 58
End: 2023-10-17
Payer: MEDICAID

## 2023-10-17 VITALS
SYSTOLIC BLOOD PRESSURE: 137 MMHG | BODY MASS INDEX: 30.22 KG/M2 | HEIGHT: 64 IN | HEART RATE: 76 BPM | OXYGEN SATURATION: 97 % | DIASTOLIC BLOOD PRESSURE: 91 MMHG | WEIGHT: 177 LBS | TEMPERATURE: 97.8 F

## 2023-10-17 PROCEDURE — 99215 OFFICE O/P EST HI 40 MIN: CPT

## 2023-10-19 ENCOUNTER — TRANSCRIPTION ENCOUNTER (OUTPATIENT)
Age: 58
End: 2023-10-19

## 2023-11-08 ENCOUNTER — APPOINTMENT (OUTPATIENT)
Dept: PHYSICAL MEDICINE AND REHAB | Facility: CLINIC | Age: 58
End: 2023-11-08
Payer: MEDICAID

## 2023-11-08 PROCEDURE — 99443: CPT

## 2023-11-29 ENCOUNTER — APPOINTMENT (OUTPATIENT)
Dept: INTERNAL MEDICINE | Facility: CLINIC | Age: 58
End: 2023-11-29
Payer: MEDICAID

## 2023-11-29 VITALS — HEIGHT: 64 IN | WEIGHT: 178 LBS | BODY MASS INDEX: 30.39 KG/M2

## 2023-11-29 DIAGNOSIS — E66.9 OBESITY, UNSPECIFIED: ICD-10-CM

## 2023-11-29 PROCEDURE — 97802 MEDICAL NUTRITION INDIV IN: CPT

## 2023-12-01 PROBLEM — E66.9 OBESITY: Status: ACTIVE | Noted: 2023-12-01

## 2023-12-11 ENCOUNTER — APPOINTMENT (OUTPATIENT)
Dept: INTERNAL MEDICINE | Facility: CLINIC | Age: 58
End: 2023-12-11

## 2024-01-09 ENCOUNTER — APPOINTMENT (OUTPATIENT)
Dept: PHYSICAL MEDICINE AND REHAB | Facility: CLINIC | Age: 59
End: 2024-01-09
Payer: MEDICAID

## 2024-01-09 VITALS
DIASTOLIC BLOOD PRESSURE: 78 MMHG | BODY MASS INDEX: 30.39 KG/M2 | SYSTOLIC BLOOD PRESSURE: 128 MMHG | OXYGEN SATURATION: 97 % | HEART RATE: 88 BPM | HEIGHT: 64 IN | TEMPERATURE: 97.7 F | WEIGHT: 178 LBS

## 2024-01-09 DIAGNOSIS — G93.31 POSTVIRAL FATIGUE SYNDROME: ICD-10-CM

## 2024-01-09 DIAGNOSIS — M25.652 STIFFNESS OF RIGHT HIP, NOT ELSEWHERE CLASSIFIED: ICD-10-CM

## 2024-01-09 DIAGNOSIS — S33.6XXA SPRAIN OF SACROILIAC JOINT, INITIAL ENCOUNTER: ICD-10-CM

## 2024-01-09 DIAGNOSIS — M67.959 UNSPECIFIED DISORDER OF SYNOVIUM AND TENDON, UNSPECIFIED THIGH: ICD-10-CM

## 2024-01-09 DIAGNOSIS — M25.651 STIFFNESS OF RIGHT HIP, NOT ELSEWHERE CLASSIFIED: ICD-10-CM

## 2024-01-09 DIAGNOSIS — M53.3 SACROCOCCYGEAL DISORDERS, NOT ELSEWHERE CLASSIFIED: ICD-10-CM

## 2024-01-09 DIAGNOSIS — M53.2X7 SPINAL INSTABILITIES, LUMBOSACRAL REGION: ICD-10-CM

## 2024-01-09 DIAGNOSIS — M76.02 GLUTEAL TENDINITIS, RIGHT HIP: ICD-10-CM

## 2024-01-09 DIAGNOSIS — M76.01 GLUTEAL TENDINITIS, RIGHT HIP: ICD-10-CM

## 2024-01-09 DIAGNOSIS — M41.50 OTHER SECONDARY SCOLIOSIS, SITE UNSPECIFIED: ICD-10-CM

## 2024-01-09 DIAGNOSIS — Q76.49 OTHER CONGENITAL MALFORMATIONS OF SPINE, NOT ASSOCIATED WITH SCOLIOSIS: ICD-10-CM

## 2024-01-09 PROCEDURE — 99214 OFFICE O/P EST MOD 30 MIN: CPT

## 2024-02-04 PROBLEM — M67.959 TENDINOPATHY OF GLUTEUS MEDIUS: Status: ACTIVE | Noted: 2023-08-08

## 2024-02-04 PROBLEM — M53.2X7: Status: RESOLVED | Noted: 2023-09-28 | Resolved: 2024-02-04

## 2024-02-04 PROBLEM — M25.651 DECREASED RANGE OF MOTION OF BOTH HIPS: Status: ACTIVE | Noted: 2023-08-08

## 2024-02-04 PROBLEM — M41.50 DEGENERATIVE SCOLIOSIS IN ADULT PATIENT: Status: ACTIVE | Noted: 2023-10-17

## 2024-02-04 PROBLEM — Q76.49 SACRALIZATION OF LUMBAR VERTEBRA: Status: ACTIVE | Noted: 2023-09-28

## 2024-02-04 PROBLEM — M53.3 SACROILIAC JOINT DYSFUNCTION: Status: ACTIVE | Noted: 2023-08-08

## 2024-02-04 PROBLEM — G93.31 POST VIRAL SYNDROME: Status: ACTIVE | Noted: 2023-10-17

## 2024-02-04 PROBLEM — M76.01 GLUTEAL TENDINITIS OF BOTH BUTTOCKS: Status: ACTIVE | Noted: 2023-08-08

## 2024-02-04 PROBLEM — S33.6XXA: Status: RESOLVED | Noted: 2023-08-08 | Resolved: 2024-02-04

## 2024-02-04 NOTE — DATA REVIEWED
[FreeTextEntry1] : SITE PERFORMED: [facility] SITE PHONE: [phone] Patient: FRANKIE NESBITT YOB: 1965 Phone: [phone] MRN: [id number] Acc: [id number] Date of Exam: 10- EXAM: MRI LUMBAR SPINE WITHOUT CONTRAST HISTORY: Lower back pain. TECHNIQUE: Multiplanar, multi-sequential MRI of the lumbar spine was obtained on a 1.5T scanner using a standard protocol. COMPARISON: MRI lumbar spine 10/5/2016. FINDINGS: For purposes of this dictation, the last well-formed disc space will be labeled L5-S1. OSSEOUS STRUCTURES: Vertebral body heights are preserved. No marrow edema or destructive marrow infiltrative process. ALIGNMENT: There is levoscoliosis of the lumbar spine. Normal lumbar lordosis is preserved. Grade 1 anterolisthesis of L5-S1. SPINAL CORD AND CONUS MEDULLARIS: The conus medullaris terminates at L1. PARASPINAL AND INTRA-ABDOMINAL SOFT TISSUES: Unremarkable. INCLUDED THORACIC SPINE AND SACRUM: Unremarkable. DISCS: Multilevel disc desiccation. Disc heights are maintained. The following axial levels are imaged and detailed below: T12-L1: No disc bulging or herniation. No spinal canal or foraminal stenosis. L1-L2: No disc bulging or herniation. No spinal canal or foraminal stenosis. L2-L3: No disc bulging or herniation. No spinal canal or foraminal stenosis. L3-L4: No disc bulging or herniation. No spinal canal or foraminal stenosis. L4-L5: Mild right eccentric disc bulge with mild right foraminal stenosis. No spinal canal or left foraminal stenosis. Findings are stable. L5-S1: Grade 1 anterolisthesis of L5-S1, disc uncovering and facet arthrosis with moderate right and severe left foraminal stenosis with impingement of the exiting left L5 nerve. Severe left and moderate right facet arthrosis, which has progressed. No spinal canal stenosis. IMPRESSION: MRI of the lumbar spine demonstrates: Levoscoliosis of the lumbar spine. Grade 1 anterolisthesis of L5-S1 with severe left and moderate right facet arthrosis, which has progressed. At L4-5, mild right eccentric disc bulge with mild right foraminal stenosis. At L5-S1, severe left and moderate right foraminal stenosis with impingement of the exiting left L5 nerve.    Frankie NESBITT 1965 (58y) F Chart Update: 23-Aug-2023  Previous Next  Close XR PELVIS AP ONLY 1 OR 2 VIEWS  Results Results Hx Details Questions Add'l Details Charging Encounter Annotations Results:	  XR PELVIS AP ONLY 1 OR 2 VIEWS             Final  No Documents Attached    	 ACC: [id number]     EXAM:  XR PELVIS AP ONLY 1-2 VIEWS   ORDERED BY: KEVIN ELIAS  PROCEDURE DATE:  08/15/2023    INTERPRETATION:  INDICATION: Knee pain  TECHNIQUE: AP view of the pelvis  COMPARISON: 6/11/2023  FINDINGS:  There is no fracture or dislocation. The hip joint spaces and alignment are preserved.. Degenerative changes of the pubic symphysis. There is no focal soft tissue abnormality.   IMPRESSION:  No significant hip joint space narrowing.  Per my read, right center edge 28 degrees, left center edge 34 degrees; possibly due to scoliosis  --- End of Report ---      JAMES METCALF MD; Attending Radiologist This document has been electronically signed. Aug 18 2023 12:05PM  Ordered by: KEVIN ELIAS       Collected/Examined: [id number] 05:33PM       Verified by: KEVIN ELIAS [id number] 10:46AM       Result Communication: No patient communication needed at this time; Stage: Final       Performed at: [facility]       Resulted: [id number] 12:05PM       Last Updated: [id number] 10:46AM       Accession: [id number]       Results Hx:	 Goal	[id number] Item Name		05:33PM	11:24AM XR PELVIS AP ONLY 1 OR 2  VIEWS		Report 1	Report 2  * indicates comments or annotations. Hover * or Report to view full result. Right click on result to view in new window.  Report #1 - [id number] 05:33PM - XR PELVIS AP ONLY 1 OR 2 VIEWS ACC: [id number]     EXAM:  XR PELVIS AP ONLY 1-2 VIEWS   ORDERED BY: KEVIN ELIAS  PROCEDURE DATE:  08/15/2023    INTERPRETATION:  INDICATION: Knee pain  TECHNIQUE: AP view of the pelvis  COMPARISON: 6/11/2023  FINDINGS:  There is no fracture or dislocation. The hip joint spaces and alignment are preserved.. Degenerative changes of the pubic symphysis. There is no focal soft tissue abnormality.   IMPRESSION:  No significant hip joint space narrowing.  --- End of Report ---      JAMES METCALF MD; Attending Radiologist This document has been electronically signed. Aug 18 2023 12:05PM  Report #2 - [id number] 11:24AM - XR PELVIS AP ONLY 1 OR 2 VIEWS ACC: [id number]     EXAM:  XR PELVIS AP ONLY 1-2 VIEWS   ORDERED BY: JHONATAN GOMES  PROCEDURE DATE:  06/07/2023    INTERPRETATION:  INDICATION: Knee pain  TECHNIQUE: AP view of the pelvis  COMPARISON: None available  FINDINGS:  There is no fracture or dislocation. There are mild degenerative changes of both hips.  There is no focal soft tissue abnormality.   IMPRESSION:  Mild degenerative changes of both hips.  --- End of Report ---      JAMES METCALF MD; Attending Radiologist This document has been electronically signed. Jun 9 2023 11:35AM Details:	 Scheduled:	 Status:	Complete For:	 Recorded as History:	[id number] 10:46AM Overdue:	31Hfy2832 12:00AM To Be Performed:	 Communicated By:	Recorded Priority:	Routine Ordered By:	KEVIN ELIAS Supervised By:	KEVIN ELIAS Managed By:	KEVIN ELIAS Authorization:	Not Required Performing Instructions:	 Patient Instructions:	 Order Instructions:	 Questions:	          (none) Add'l Details:	 Financial Auth:	 Authorization #:	 Appt. Status:	Appointment Not Needed Effective:	[id number] 12:09PM Expires:	62Axm4259 12:09PM Done:	79Mto1792 05:33PM Order #:	[id number] Requisition #:	[id number] Label Type:	 Collection:	Collection Specimen Identifier:	 ID:	 CPT:	 LOINC:	 SNOMED:	 Type:	 Charges:	None Will Be Collected in Office?	No Score:	0 NDS#:	 Content Source:	 Justification:	 View link item history	 Goals:	None Charging:	 Override Encounter Details: Special Billing:	 Account #:	 Injury Date:	8/18/2023 12:09:00 PM Description:	 Encounters:	 Creation:	[id number] Result Review KEVIN ELIAS (Physical Medicine and Rehab)  Collection:	None Specified Be Done By:	None Specified Scheduled:	None Specified Performed:	None Specified Charge :	None Specified Annotations:	          (none) Edit Audit Task Annotate Frankie Patricia 1965 (58y) F Chart Update: 23-Aug-2023  Previous Next  Close XR LUMBAR SPINE 4 VIEWS INCLUDING OBLIQUE  Results Results Hx Details Questions Add'l Details Charging Encounter Annotations Results:	  XR LUMBAR SPINE 4 VIEWS INCLUDING OBLIQUE             Final  No Documents Attached   Changed By [facility] - Reason: INCORRECT EXAM ORDERED  	 ACC: [id number]     EXAM:  XR LS SPINE W OBLIQUES MIN 4V   ORDERED BY: KEVIN ELIAS  PROCEDURE DATE:  08/15/2023    INTERPRETATION:  Clinical history/reason for exam:Low back pain and radiculopathy  Comparison: None.  Procedure: AP, lateral, flexion, extension, bilateral oblique views, and spot views of the lower lumbar spine (7 total views of the lumbar spine).  Findings: There are 4 nonrib-bearing lumbar-type vertebral bodies with sacralization of the L5 vertebral body. There is marked S-shaped scoliosis of the thoracic and lumbar spine. There is grade 1 anterolisthesis of L3 on L4 and L4 on L5 which is exaggerated on flexion and reduces on extension. The vertebral body heights are maintained. There is mild disc space narrowing throughout the lumbar spine.  Impression: Sacralization of the L5 vertebral body. Marked S-shaped scoliosis of the thoracic and lumbar spine. Grade 1 anterolisthesis L3-4 and L4-5 with motion on flexion-extension.  --- End of Report ---      TALHA CLARKE MD; Attending Radiologist This document has been electronically signed. Aug 18 2023  1:59PM  Ordered by: KEVIN ELIAS       Collected/Examined: 90Llz1659 05:34PM       Verified by: KEVIN ELIAS 07Lhg2935 10:46AM       Result Communication: No patient communication needed at this time; Stage: Final       Performed at: [facility]       Resulted: 85Vag0520 01:54PM       Last Updated: 23Aug2023 10:46AM       Accession: [id number]       Results Hx:	 There are no additional results to show Details:	 Scheduled:	 Status:	Complete For:	 Recorded as History:	23Aug2023 10:46AM Overdue:	28Aug2023 12:00AM To Be Performed:	 Communicated By:	Recorded Priority:	Routine Ordered By:	KEVIN ELIAS Supervised By:	KEVIN ELIAS Managed By:	KEVIN ELIAS Authorization:	Not Required Performing Instructions:	 Patient Instructions:	 Order Instructions:	 Questions:	          (none) Add'l Details:	 Financial Auth:	 Authorization #:	 Appt. Status:	Appointment Not Needed Effective:	[id number] 02:02PM Expires:	18Aug2023 02:02PM Done:	[id number] 05:34PM Order #:	[id number] Requisition #:	[id number] Label Type:	 Collection:	Collection Specimen Identifier:	 ID:	 CPT:	 LOINC:	 SNOMED:	 Type:	 Charges:	None Will Be Collected in Office?	No Score:	0 NDS#:	 Content Source:	 Justification:	 View link item history	 Goals:	None Charging:	 Override Encounter Details: Special Billing:	 Account #:	 Injury Date:	8/18/2023 2:02:30 PM Description:	 Encounters:	 Creation:	[id number] Result Review KEVIN ELIAS (Physical Medicine and Rehab)  Collection:	None Specified Be Done By:	None Specified Scheduled:	None Specified Performed:	None Specified Charge :	None Specified Annotations:	          (none) Edit Audit Task Annotate [employer]

## 2024-02-04 NOTE — HISTORY OF PRESENT ILLNESS
[FreeTextEntry1] : Jacob Santos M.D. Sports Medicine and Interventional Spine Department of Physical Medicine and Rehabilitation  [facility] Orth[facility] [address] [facility], [address]th [place] [address]  [facility] Physician [facility] [place] at [facility] [address] [place] [address]  [facility] at  [facility] [address], [address] [place] [address]  For [facility] Appointments Phone: [phone] Fax: [phone]  ----------------------------------------------------------------------------------------------------------------------------------------  PATIENT: FRANKIE NESBITT  MRN: [id number]  YOB: 1965  DATE OF SERVICE: 1/9/2024 Jan 09, 2024  Dear Elsie  Thank you for referring FRANKIE NESBITT to my Sports and Interventional Spine practice and office. Enclosed is a copy of the patient's consultation/progress note, which includes my complete assessment and recent studies completed during the patient's evaluation.  If you have questions or have any patients who require nonsurgical, non-opiate management of any sports, spine, or musculoskeletal conditions, please do not hesitate to contact my , Su Londono at [phone].  I look forward to taking care of your patients along with you.  Sincerely,  Jacob Santos MD Sports, Interventional Spine, & Regenerative Musculoskeletal Medicine [facility] [place] at [facility]                                                     Follow Up Visit CC: left leg pain  HPI:  This is a follow up visit to [facility] [place] at [facility] Sports Medicine and Interventional Spine Practice.    FRANKIE PRAT presents with the chief complaint as above.    Interval Hx on Jan 09, 2024: presents for follow up. Since last visit, patient's symptoms are overall better. patient has been doing more HEP since the last visit, researching further exercises. patient completed therapy program with [facility] at [facility]. patient has been doing home exercises 4-5 times per week. patient is feeling "effing great." patient has been able to tolerate standing for 8+ hours Friday and Saturday including mile walk to and from work; tolerating better than the last visit. There have been no significant changes to their aggravating or alleviating factors since the last visit. Since the last visit, they relate improvements in pain previously associated with known exacerbating, aggravating factors and situations. Pharmacologic treatments now include OTC analgesics PRN, but otherwise pharmacologic treatments are minimal. Denies new or worsened numbness, tingling, or focal motor deficit. Denies interval fall, accident, or injury. Denies change in bowel or bladder functioning. Patient notes improved motivation for continuing their therapy program. Patient has been able to tolerate hour brisk walking. Patient has been using minimal to no compound of late. patient has been able to do their stationary cycling for about 10 miles. Has minimal radicular pain about the left anterior lower limb.   Meds: denies regular PO pain medications, as above otherwise Therapy Program: no recent structured targeted therapy program HEP: doing HEP regularly  Assoc Sx: Denies numbness, Tingling Denies Focal motor weakness in the upper or lower limbs Denies New or worsened bowel or bladder incontinence Denies Saddle anesthesia Denies Using Orthotic(s)/Supportive devices Denies Swelling in the upper/lower extremities They also deny frequent tripping, falling  ROS: A 14 point review of systems was completed. Positive findings are pain as described above. The remaining systems negative.  Breast Cancer Surveillance: up to date COVID HX: reviewed  Interval Hx on Nov 08, 2023: presents for follow up. Since last visit, they underwent further diagnostic workup including additional imaging studies. Patient informed of all relevant imaging findings, all questions were answered including MRI of the lumbar spine from 10/30/2023 demonstrating grade 1 anterolisthesis of L5 on S1, L5/S1 B/L foraminal stenosis. mild right foraminal stenosis. There have been no significant changes to their aggravating or alleviating factors since the last visit. Pharmacologic treatments now include OTC analgesics PRN, otherwise pharmacologic treatments are minimal. Denies new or worsened numbness, tingling, or focal motor deficit. Denies interval fall, accident, or injury. Denies change in bowel or bladder functioning. patient describes increased exercises with their PT since their last visit. Patient believes their pain can interfere despite having found a therapist with whom they have developed better rapport of late at [facility] ([facility]). Patient notes having viral illness over the past few weeks. Patient has managed to walk about one hour at a time without pausing, some slight hip tightness after the longer walking. Writer and patient discussed the role of possible epidural injections. Patient will follow up with our practice as planned following last visit.  Interval Hx on Oct 17, 2023: presents for follow up. Since last visit, they have been unable to resume PT due to respiratory symptoms and are applying the topical compound cream. Patient plans on starting Wellbutrin for seasonal affective disorder with the guidance of their PCP. Patient has been having trouble with executive functioning including finding the motivation to participate in PT. Patient applying compound to the left gluteal region about the outer left posterior hip. Pain persists over the left gluteal region, left SI joint, radiates in an L5 distribution; MUCH less radiating pain, "not travelling as far" since their initial visit. Patient has been doing home stretching routine. Since the last visit, they relate improvements in pain previously associated with known exacerbating, aggravating factors and situations. Pharmacologic treatments now include OTC analgesics PRN, but otherwise pharmacologic treatments are minimal. Denies new or worsened numbness, tingling, or focal motor deficit. Denies interval fall, accident, or injury. Denies change in bowel or bladder functioning. When patient does increased exertion (most Fridays, Saturdays) they tend to have variable but worsened symptoms (severity varies).  Interval Hx on Sep 28, 2023: presents for follow up. Since last visit, they underwent further diagnostic workup including additional imaging studies. Patient informed of all relevant imaging findings, all questions were answered including L3 on L4, L4 on L5 with flexion extension. also with sacralization of L5. There have been no significant changes to their aggravating or alleviating factors since the last visit. Pharmacologic treatments now include OTC analgesics PRN, otherwise pharmacologic treatments are minimal. Denies new or worsened numbness, tingling, or focal motor deficit. Denies interval fall, accident, or injury. Denies change in bowel or bladder functioning. patient has been dealing with cough and flu-like symptoms, for over 10 days, tested negative for COVID and strep. patient attended PT for about one week, then was unable to go due to illness. patient has been having difficulty with attending PT due to having therapist leave the practice she was going to. Denies trouble breathing, some exertional shortness of breath. MRI lumbar spine without contrast is indicated given that the pt has not improved with tylenol, ibuprofen, naproxen, meloxicam, they obtained non-diagnostic radiographs with l3 on L4 and l4 on L5 dynamic instabilit, dynamic instability of the lumbar spine on radiographs, persistent pain, declining functional abilities, necessary for surgical treatments. This is medically necessary to outline targets for locally (interventional) directed treatments and/or guide surgical management. Patient will follow up with our practice as planned following last visit.  Initial Hx on 08/08/2023: Presents in person to [facility]. patient with left low back, left gluteal pain that extends into the left lower limb, crossing the knee into the lower limb in L5, S1 distribution. pain has disrupted her usual activities, so she has been using advil PRN, provides modest relief (70-80% better with meds). The patients difficulties began years ago, has recently been more symptomatic prior to presentation. has not yet enrolled in a physical therapy program unsure of the benefit at this time. The pain is graded as moderate to intermittently severe. The pain is described as burning pain at times. The pain is intermittent. The pain radiates in the LEFT LOWER limbs in a L5, S1 distribution. pain is worse during bridge movements, will usually make her pain worse. The patient feels that the pain is overall persistent. Patient denies other recent fall, MVA, injury, trauma, or accident besides presenting history above. Aggravating: hip bridges, every day exertion, at its worst there is "zero position thats good for it"Alleviating: rest, activity modification, pharmacologic treatments  Assoc Hx: Ambulates without assistive device Injection Hx: denies locally directed treatment to the area in question Imaging Hx: reviewed  Level of functioning: indep with ambulation, indep with ADLs Living Situation: dwelling with steps to enter

## 2024-02-04 NOTE — PHYSICAL EXAM
[FreeTextEntry1] : Gen: A+O x 3 in NAD Psych: labile ood and affect. Responds appropriately to commands. tearful at times Eyes: Anicteric. No discharge. EOMI. Resp: Breathing unlabored CV: DP pulses 2+ and equal. No varicosities noted Ext: No c/c/e Skin: No lesions noted  Gait: Non antalgic  +  reciprocating heel to toe able to stand on toes and heels WITH hand holding Tandem gait intact WITH hand holding Poor/Fair single leg standing balance, left Romberg negative  Neg Trendelenburg sign with single leg stance  Inspection: Spine alignment is midline. Palpation: There is + tenderness over the midline spinous processes, paravertebral muscles of the thoracolumbar region Lumbar ROM: Flexion, extension, side-bending, rotation, limited in most planes to the right >> left  persistent 2024 +pain with lateral flexion persistent 2024 +pain with oblique extension persistent 2024 +pain with lateral rotation   Hip ROM: neg pain at terminal ROM bilaterally. FAIR, FABERE negative bilaterally.  	Hip Flex       Knee Ext      Ankle Dorsi           EHL        Ankle Plantar Right	4/5	        5/5	                 5/5	          4/5	             5/5                            Left	        4/5	        5/5	                 5/5	          4/5	             5/5                             Hip abduction R 4+/5 L 4/5 Hip adduction R 4+/5 L 4/5 Hip extension R 4+/5 L 4/5 Knee Flexion R 4+/5 L4 /5  Tone: Normal. No clonus. Sensation: Grossly intact to light touch bilateral lower limbs. Proprioception: Intact at big toes bilaterally. Reflexes: 1+ symmetric knee jerk, ankle jerk.  Plantars downgoing bilaterally.  SLR negative bilaterally Crossed SLR negative bilaterally. Slump Test negative bilaterally  persistent 2024 prone gapping test + L>R persistent 2024 yeoman + L>R persistent 2024 nachlas + L>R active hip extension was more difficult on the left, knee extended more difficult  Antalgic Gait not present During double legged stance, weight bearing symmetrically Able to maintain double legged stance with eyes closed Able to maintain single legged stance on both lower limbs with eyes closed Neg gross shortening of either lower limb  RIGHT Hip: neg effusion neg warmth neg scars neg lesions no arthritic deformities of the right lower limb  Palpation: no TTP over the anterior iliac tubercules no TTP over the ASIS no TTP over the posterior iliac crest no TTP over the PSIS no TTP over the apex of the sacrum no TTP over the SI joint no TTP over the ischial tuberosity no TTP over the greater trochanter  AROM: active range of motion full and painless, within functional limits  PROM consistent with above  Manual Muscle Testin/5 in all planes with isometric resistance  Neg MORGAN Neg FADIR Neg Leg Roll Neg Stinchfield Neg Xiomara Neg Joya's Test (Distal ITB) Neg Maico  OA: neg limited active hip flexion with lateral hip pain neg painful active hip extension neg less than 25 degrees passive internal rotation of the hip neg pain with hip scouring maneuver neg pain with squatting/limited squat unable to maintain supine plank testing  Sensation intact to light touch over all aspects of the RIGHT hip  LEFT Hip: neg effusion neg warmth neg scars neg lesions no arthritic deformities of the right lower limb  Palpation: no TTP over the anterior iliac tubercules no TTP over the ASIS no TTP over the posterior iliac crest no TTP over the PSIS no TTP over the apex of the sacrum no TTP over the SI joint no TTP over the ischial tuberosity no TTP over the greater trochanter  AROM: active range of motion full and painless, within functional limits  Sagittal Plane Rotation: Flexion 110/110-120 Extension 5/10-15  Frontal Plane Rotation: Abduction 20/30-50 Adduction 20/30  Horizontal Plane Rotation: Internal Rotation 15/30-40 External Rotation 15/40-60  PROM consistent with above  Manual Muscle Testin/5 in all planes with isometric resistance  Neg MORGAN Neg FADIR Neg Leg Roll Neg Stinchfield Neg Xiomara Neg Joya's Test (Distal ITB) Neg Maico No pain, apprehension, or irregularity of movement with hip scour maneuver  OA: neg limited active hip flexion with lateral hip pain neg painful active hip extension neg less than 25 degrees passive internal rotation of the hip neg pain with hip scouring maneuver neg pain with squatting/limited squat unable to maintain supine plank testing  Sensation intact to light touch over all aspects of the LEFT hip

## 2024-02-04 NOTE — ASSESSMENT
[FreeTextEntry1] : FRANKIE NESBITT is a 58 year female with left low back pain here for follow up   Degenerative scoliosis in adult Lumbar facet arthropathy  Gluteal tendinitis, L>R Sacroiliac joint dysfunction, left Gluteus medius tendinopathy, B/L  Sprain of sacroiliac joint region, L Chronic lumbar radiculopathy, L5, S1, left Spasm of lumbar paraspinous muscles, B/L  Osteoarthritis of knee, B/L  - Tiers of treatment and management of above diagnosis(es) were discussed with patient - Optimal diet, weight, sleep, and lifestyle management to minimize stress and maximize well being counseling provided - Imaging reviewed and discussed with patient - Reviewed previous encounter notes from 11/29/2023 Janie Lozoya, 7/26/2023 Dr. JOEL Padilla ([facility]) - Patient was educated on an appropriate home exercise program, provided with exercise recommendations, all questions answered - patient may continue meloxicam per specialists, encouraged less consumption overall - Patient may trial acetaminophen 1000mg up to TID PRN moderate to severe pain and to decrease average consumption of NSAID - Patient may continue to trial topical compound cream to the left posterolateral hip no more than twice per day as needed for next 2-3 weeks, Rx entered via portal, written information provided to the patient in addition to verbal explanation regarding the medication - 10/17/2023: Writer and patient shared an extended conversation about risks, benefits, and alternatives to gabapentin, with the potential risk of depressed mood decision made to hold off for now; 01/09/2024 advised to continue to review medication information - Provided with contact information for Physiologic [yoga, pilates]  - Patient was advised to apply cool compresses or warm heat to affected regions PRN - Ordered radiographs of the lumbosacral, pelvic region 8/8/2023, reviewed with patient via TTM and in office extensively with hip angle measurements in office 10/17/2023  - Patient encouraged to have consultation with Nutritionist given harder time of late with weight reduction, post COVID, referral placed 10/17/2023 and provided with contact information  - Educated about red flag symptoms including (but not limited to) new, worsened, or persistent: fever greater than 100F, bowel or bladder incontinence, bowel obstipation, inability to void urine, urinary leakage, Severe nausea or vomiting, Worsening numbness, worsening tingling/paresthesias, and/or new or progressive motor weakness; advised to seek immediate medical attention at his nearest Emergency department should they experience any of the above  - Follow up in 2-3 months to reassess need for hip trigger point injections  I have personally spent a total of at least 45 minutes preparing, reviewing internal and external records, explaining, counseling, and coordinating care for this patient encounter.  Thank you, (s), for allowing me to participate in the care of your patient. Please do not hesitate to contact me with questions/concerns.  Jacob Santos M.D. Sports and Interventional Spine Department of Physical Medicine and Rehabilitation [facility] Physician [facility] Orthopaedic [facility] [address] [place] [address]  Appointments: [phone] Fax: [phone]

## 2024-03-25 ENCOUNTER — APPOINTMENT (OUTPATIENT)
Dept: PHYSICAL MEDICINE AND REHAB | Facility: CLINIC | Age: 59
End: 2024-03-25
Payer: MEDICAID

## 2024-03-25 VITALS
WEIGHT: 178 LBS | OXYGEN SATURATION: 97 % | HEART RATE: 74 BPM | TEMPERATURE: 97.7 F | DIASTOLIC BLOOD PRESSURE: 76 MMHG | SYSTOLIC BLOOD PRESSURE: 107 MMHG | BODY MASS INDEX: 30.39 KG/M2 | HEIGHT: 64 IN

## 2024-03-25 PROCEDURE — G2211 COMPLEX E/M VISIT ADD ON: CPT | Mod: NC,1L

## 2024-03-25 PROCEDURE — 99214 OFFICE O/P EST MOD 30 MIN: CPT

## 2024-03-27 RX ORDER — HYALURONATE SODIUM 20 MG/2 ML
20 SYRINGE (ML) INTRAARTICULAR
Qty: 3 | Refills: 0 | Status: ACTIVE | OUTPATIENT
Start: 2024-03-27

## 2024-04-04 ENCOUNTER — RESULT REVIEW (OUTPATIENT)
Age: 59
End: 2024-04-04

## 2024-04-04 ENCOUNTER — OUTPATIENT (OUTPATIENT)
Dept: OUTPATIENT SERVICES | Facility: HOSPITAL | Age: 59
LOS: 1 days | End: 2024-04-04
Payer: MEDICAID

## 2024-04-04 PROCEDURE — 73030 X-RAY EXAM OF SHOULDER: CPT

## 2024-04-04 PROCEDURE — 73030 X-RAY EXAM OF SHOULDER: CPT | Mod: 26,LT

## 2024-04-05 DIAGNOSIS — M75.50 BURSITIS OF UNSPECIFIED SHOULDER: ICD-10-CM

## 2024-04-13 NOTE — DATA REVIEWED
[FreeTextEntry1] : SITE PERFORMED: [facility] [place] SITE PHONE: [phone] Patient: FRANKIE NESBITT YOB: 1965 Phone: [phone] MRN: [id number] Acc: [id number] Date of Exam: 10- EXAM: MRI LUMBAR SPINE WITHOUT CONTRAST HISTORY: Lower back pain. TECHNIQUE: Multiplanar, multi-sequential MRI of the lumbar spine was obtained on a 1.5T scanner using a standard protocol. COMPARISON: MRI lumbar spine 10/5/2016. FINDINGS: For purposes of this dictation, the last well-formed disc space will be labeled L5-S1. OSSEOUS STRUCTURES: Vertebral body heights are preserved. No marrow edema or destructive marrow infiltrative process. ALIGNMENT: There is levoscoliosis of the lumbar spine. Normal lumbar lordosis is preserved. Grade 1 anterolisthesis of L5-S1. SPINAL CORD AND CONUS MEDULLARIS: The conus medullaris terminates at L1. PARASPINAL AND INTRA-ABDOMINAL SOFT TISSUES: Unremarkable. INCLUDED THORACIC SPINE AND SACRUM: Unremarkable. DISCS: Multilevel disc desiccation. Disc heights are maintained. The following axial levels are imaged and detailed below: T12-L1: No disc bulging or herniation. No spinal canal or foraminal stenosis. L1-L2: No disc bulging or herniation. No spinal canal or foraminal stenosis. L2-L3: No disc bulging or herniation. No spinal canal or foraminal stenosis. L3-L4: No disc bulging or herniation. No spinal canal or foraminal stenosis. L4-L5: Mild right eccentric disc bulge with mild right foraminal stenosis. No spinal canal or left foraminal stenosis. Findings are stable. L5-S1: Grade 1 anterolisthesis of L5-S1, disc uncovering and facet arthrosis with moderate right and severe left foraminal stenosis with impingement of the exiting left L5 nerve. Severe left and moderate right facet arthrosis, which has progressed. No spinal canal stenosis. IMPRESSION: MRI of the lumbar spine demonstrates: Levoscoliosis of the lumbar spine. Grade 1 anterolisthesis of L5-S1 with severe left and moderate right facet arthrosis, which has progressed. At L4-5, mild right eccentric disc bulge with mild right foraminal stenosis. At L5-S1, severe left and moderate right foraminal stenosis with impingement of the exiting left L5 nerve.    Frankie NESBITT 1965 (58y) F Chart Update: 23-Aug-2023  Previous Next  Close XR PELVIS AP ONLY 1 OR 2 VIEWS  Results Results Hx Details Questions Add'l Details Charging Encounter Annotations Results:	  XR PELVIS AP ONLY 1 OR 2 VIEWS             Final  No Documents Attached    	 ACC: [id number]     EXAM:  XR PELVIS AP ONLY 1-2 VIEWS   ORDERED BY: KEVIN ELIAS  PROCEDURE DATE:  08/15/2023    INTERPRETATION:  INDICATION: Knee pain  TECHNIQUE: AP view of the pelvis  COMPARISON: 6/11/2023  FINDINGS:  There is no fracture or dislocation. The hip joint spaces and alignment are preserved.. Degenerative changes of the pubic symphysis. There is no focal soft tissue abnormality.   IMPRESSION:  No significant hip joint space narrowing.  Per my read, right center edge 28 degrees, left center edge 34 degrees; possibly due to scoliosis  --- End of Report ---      JAMES METCALF MD; Attending Radiologist This document has been electronically signed. Aug 18 2023 12:05PM  Ordered by: KEVIN ELIAS       Collected/Examined: [id number] 05:33PM       Verified by: KEVIN ELIAS [id number] 10:46AM       Result Communication: No patient communication needed at this time; Stage: Final       Performed at: [facility]       Resulted: [id number] 12:05PM       Last Updated: [id number] 10:46AM       Accession: [id number]       Results Hx:	 Goal	[id number] Item Name		05:33PM	11:24AM XR PELVIS AP ONLY 1 OR 2  VIEWS		Report 1	Report 2  * indicates comments or annotations. Hover * or Report to view full result. Right click on result to view in new window.  Report #1 - [id number] 05:33PM - XR PELVIS AP ONLY 1 OR 2 VIEWS ACC: [id number]     EXAM:  XR PELVIS AP ONLY 1-2 VIEWS   ORDERED BY: KEVIN ELIAS  PROCEDURE DATE:  08/15/2023    INTERPRETATION:  INDICATION: Knee pain  TECHNIQUE: AP view of the pelvis  COMPARISON: 6/11/2023  FINDINGS:  There is no fracture or dislocation. The hip joint spaces and alignment are preserved.. Degenerative changes of the pubic symphysis. There is no focal soft tissue abnormality.   IMPRESSION:  No significant hip joint space narrowing.  --- End of Report ---      JAMES METCALF MD; Attending Radiologist This document has been electronically signed. Aug 18 2023 12:05PM  Report #2 - [id number] 11:24AM - XR PELVIS AP ONLY 1 OR 2 VIEWS ACC: [id number]     EXAM:  XR PELVIS AP ONLY 1-2 VIEWS   ORDERED BY: JHONATAN GOMES  PROCEDURE DATE:  06/07/2023    INTERPRETATION:  INDICATION: Knee pain  TECHNIQUE: AP view of the pelvis  COMPARISON: None available  FINDINGS:  There is no fracture or dislocation. There are mild degenerative changes of both hips.  There is no focal soft tissue abnormality.   IMPRESSION:  Mild degenerative changes of both hips.  --- End of Report ---      JAMES METCALF MD; Attending Radiologist This document has been electronically signed. Jun 9 2023 11:35AM Details:	 Scheduled:	 Status:	Complete For:	 Recorded as History:	[id number] 10:46AM Overdue:	59Gtx3401 12:00AM To Be Performed:	 Communicated By:	Recorded Priority:	Routine Ordered By:	KEVIN ELIAS Supervised By:	KEVIN ELIAS Managed By:	KEVIN ELIAS Authorization:	Not Required Performing Instructions:	 Patient Instructions:	 Order Instructions:	 Questions:	          (none) Add'l Details:	 Financial Auth:	 Authorization #:	 Appt. Status:	Appointment Not Needed Effective:	[id number] 12:09PM Expires:	[id number] 12:09PM Done:	[id number] 05:33PM Order #:	[id number] Requisition #:	[id number] Label Type:	 Collection:	Collection Specimen Identifier:	 ID:	 CPT:	 LOINC:	 SNOMED:	 Type:	 Charges:	None Will Be Collected in Office?	No Score:	0 NDS#:	 Content Source:	 Justification:	 View link item history	 Goals:	None Charging:	 Override Encounter Details: Special Billing:	 Account #:	 Injury Date:	8/18/2023 12:09:00 PM Description:	 Encounters:	 Creation:	[id number] Result Review KEVIN ELIAS (Physical Medicine and Rehab)  Collection:	None Specified Be Done By:	None Specified Scheduled:	None Specified Performed:	None Specified Charge :	None Specified Annotations:	          (none) Edit Audit Task Annotate Frankie Patricia 1965 (58y) F Chart Update: 23-Aug-2023  Previous Next  Close XR LUMBAR SPINE 4 VIEWS INCLUDING OBLIQUE  Results Results Hx Details Questions Add'l Details Charging Encounter Annotations Results:	  XR LUMBAR SPINE 4 VIEWS INCLUDING OBLIQUE             Final  No Documents Attached   Changed By [facility] - Reason: INCORRECT EXAM ORDERED  	 ACC: [id number]     EXAM:  XR LS SPINE W OBLIQUES MIN 4V   ORDERED BY: KEVIN ELAIS  PROCEDURE DATE:  08/15/2023    INTERPRETATION:  Clinical history/reason for exam:Low back pain and radiculopathy  Comparison: None.  Procedure: AP, lateral, flexion, extension, bilateral oblique views, and spot views of the lower lumbar spine (7 total views of the lumbar spine).  Findings: There are 4 nonrib-bearing lumbar-type vertebral bodies with sacralization of the L5 vertebral body. There is marked S-shaped scoliosis of the thoracic and lumbar spine. There is grade 1 anterolisthesis of L3 on L4 and L4 on L5 which is exaggerated on flexion and reduces on extension. The vertebral body heights are maintained. There is mild disc space narrowing throughout the lumbar spine.  Impression: Sacralization of the L5 vertebral body. Marked S-shaped scoliosis of the thoracic and lumbar spine. Grade 1 anterolisthesis L3-4 and L4-5 with motion on flexion-extension.  --- End of Report ---      TALHA CLARKE MD; Attending Radiologist This document has been electronically signed. Aug 18 2023  1:59PM  Ordered by: KEVIN ELIAS       Collected/Examined: [id number] 05:34PM       Verified by: KEVIN ELIAS [id number] 10:46AM       Result Communication: No patient communication needed at this time; Stage: Final       Performed at: [facility]       Resulted: [id number] 01:54PM       Last Updated: 23Aug2023 10:46AM       Accession: [id number]       Results Hx:	 There are no additional results to show Details:	 Scheduled:	 Status:	Complete For:	 Recorded as History:	23Aug2023 10:46AM Overdue:	28Aug2023 12:00AM To Be Performed:	 Communicated By:	Recorded Priority:	Routine Ordered By:	KEVIN ELIAS Supervised By:	KEVIN ELIAS Managed By:	KEVIN ELIAS Authorization:	Not Required Performing Instructions:	 Patient Instructions:	 Order Instructions:	 Questions:	          (none) Add'l Details:	 Financial Auth:	 Authorization #:	 Appt. Status:	Appointment Not Needed Effective:	[id number] 02:02PM Expires:	18Aug2023 02:02PM Done:	[id number] 05:34PM Order #:	[id number] Requisition #:	[id number] Label Type:	 Collection:	Collection Specimen Identifier:	 ID:	 CPT:	 LOINC:	 SNOMED:	 Type:	 Charges:	None Will Be Collected in Office?	No Score:	0 NDS#:	 Content Source:	 Justification:	 View link item history	 Goals:	None Charging:	 Override Encounter Details: Special Billing:	 Account #:	 Injury Date:	8/18/2023 2:02:30 PM Description:	 Encounters:	 Creation:	[id number] Result Review KEVIN ELIAS (Physical Medicine and Rehab)  Collection:	None Specified Be Done By:	None Specified Scheduled:	None Specified Performed:	None Specified Charge :	None Specified Annotations:	          (none) Edit Audit Task Annotate [employer]

## 2024-04-13 NOTE — ASSESSMENT
[FreeTextEntry1] : FRANKIE NESBITT is a 58 year female with left low back pain here for follow up   Degenerative scoliosis in adult Lumbar facet arthropathy  Gluteal tendinitis, L>R Sacroiliac joint dysfunction, left Gluteus medius tendinopathy, B/L  Sprain of sacroiliac joint region, L Chronic lumbar radiculopathy, L5, S1, left Spasm of lumbar paraspinous muscles, B/L  Osteoarthritis of knee, B/L  - Tiers of treatment and management of above diagnosis(es) were discussed with patient - Optimal diet, weight, sleep, and lifestyle management to minimize stress and maximize well being counseling provided - Imaging reviewed and discussed with patient - Reviewed previous encounter notes from 11/29/2023 Janie Lozoya, 7/26/2023 Dr. JOEL Padilla ([facility]) - Mar 25, 2024: advised to start PT for the left shoulder, 1-2 times per week for 6-8 weeks - Patient was educated on an appropriate home exercise program, provided with exercise recommendations, all questions answered - patient may continue meloxicam per specialists, encouraged less consumption overall - Patient may trial acetaminophen 1000mg up to TID PRN moderate to severe pain and to decrease average consumption of NSAID - Patient may continue to trial topical compound cream to the left posterolateral hip no more than twice per day as needed for next 2-3 weeks, Rx entered via portal, written information provided to the patient in addition to verbal explanation regarding the medication - Mar 25, 2024: Patient may trial topical diclofenac 1% gel QID to the site of their worst pain for the next 7 to 10 days to help with pain and relieve inflammation. Afterwards, they were advised to use only as needed. - 10/17/2023: Writer and patient shared an extended conversation about risks, benefits, and alternatives to gabapentin, with the potential risk of depressed mood decision made to hold off for now; 01/09/2024 advised to continue to review medication information - Provided with contact information for Physiologic [yoga, pilates]  - Patient was advised to apply cool compresses or warm heat to affected regions PRN - Ordered radiographs of the lumbosacral, pelvic region 8/8/2023, reviewed with patient via TTM and in office extensively with hip angle measurements in office 10/17/2023; Mar 25, 2024 ordered radiographs for the left shoulder  - Patient encouraged to have consultation with Nutritionist given harder time of late with weight reduction, post COVID, referral placed 10/17/2023 and provided with contact information  - Educated about red flag symptoms including (but not limited to) new, worsened, or persistent: fever greater than 100F, bowel or bladder incontinence, bowel obstipation, inability to void urine, urinary leakage, Severe nausea or vomiting, Worsening numbness, worsening tingling/paresthesias, and/or new or progressive motor weakness; advised to seek immediate medical attention at his nearest Emergency department should they experience any of the above  - Follow up in 2-3 months to reassess need for hip trigger point injections vs SASDB CSI  I have personally spent a total of at least 45 minutes preparing, reviewing internal and external records, explaining, counseling, and coordinating care for this patient encounter.  Thank you, (s), for allowing me to participate in the care of your patient. Please do not hesitate to contact me with questions/concerns.  Jacob Santos M.D. Sports and Interventional Spine Department of Physical Medicine and Rehabilitation [facility] Orth[facility]edic [facility] [address] [place] [address]  Appointments: [phone] Fax: [phone]

## 2024-04-13 NOTE — HISTORY OF PRESENT ILLNESS
[FreeTextEntry1] : Jacob Santos M.D. Sports Medicine and Interventional Spine Department of Physical Medicine and Rehabilitation  [facility] Orthopa[facility] [address] [place] [address]  [facility] [place] at [facility] [address] [place] [address]  For [place] Appointments Phone: [phone] Fax: [phone]  ----------------------------------------------------------------------------------------------------------------------------------------  PATIENT: FRANKIE NESBITT  MRN: [id number]  YOB: 1965  DATE OF SERVICE: Mar 25, 2024   Mar 25, 2024   Dear Drs.  Thank you for referring FRANKIE NESBITT to my Sports and Interventional Spine practice and office. Enclosed is a copy of the patient's consultation/progress note, which includes my complete assessment and recent studies completed during the patient's evaluation.  If you have questions or have any patients who require nonsurgical, non-opiate management of any sports, spine, or musculoskeletal conditions, please do not hesitate to contact my , Su Londono at [phone].  I look forward to taking care of your patients along with you.  Sincerely,  Jacob Santos MD Sports, Interventional Spine, & Regenerative Musculoskeletal Medicine Orth[facility] [place] [facility]                                                     Follow Up Visit CC: left leg pain  HPI:  This is a follow up visit to [facility] [place] at [facility] Interventional Spine Practice.    FRANKIE NESBITT presents with the chief complaint as above.    Interval Hx on Mar 25, 2024: presents for follow up. Since last visit, they had pain sensation associated with poppiing over the left shoulder. started one month ago. constant aching soreness over the left side. patient has noticed decreased ROM, no swelling, sligth "little bit" weakness in the left compared to the right. she demonstrates extension internal rotation as being quite limited, overhead movements. not trying topical or oral pharmacologic treatments. There have been no significant changes to their aggravating or alleviating factors since the last visit. patient has continued their stationary cycling, since the last visit. no previous injections to the left shoulder nor any recent imaging. Pharmacologic treatments now include OTC analgesics PRN, but otherwise pharmacologic treatments are minimal.  Denies new or worsened numbness, tingling, or focal motor deficit. Denies interval fall, accident, or injury. Denies change in bowel or bladder functioning.  Meds: denies regular PO pain medications, as above otherwise Therapy Program: no recent structured targeted therapy program HEP: doing HEP regularly  Assoc Sx: Denies numbness, Tingling Denies Focal motor weakness in the upper or lower limbs Denies New or worsened bowel or bladder incontinence Denies Saddle anesthesia Denies Using Orthotic(s)/Supportive devices Denies Swelling in the upper/lower extremities They also deny frequent tripping, falling  ROS: A 14 point review of systems was completed. Positive findings are pain as described above. The remaining systems negative.  Breast Cancer Surveillance: up to date COVID HX: reviewed  Interval Hx on Jan 09, 2024: presents for follow up. Since last visit, patient's symptoms are overall better. patient has been doing more HEP since the last visit, researching further exercises. patient completed therapy program with [facility] at [facility]. patient has been doing home exercises 4-5 times per week. patient is feeling "effing great." patient has been able to tolerate standing for 8+ hours Friday and Saturday including mile walk to and from work; tolerating better than the last visit. There have been no significant changes to their aggravating or alleviating factors since the last visit. Since the last visit, they relate improvements in pain previously associated with known exacerbating, aggravating factors and situations. Pharmacologic treatments now include OTC analgesics PRN, but otherwise pharmacologic treatments are minimal. Denies new or worsened numbness, tingling, or focal motor deficit. Denies interval fall, accident, or injury. Denies change in bowel or bladder functioning. Patient notes improved motivation for continuing their therapy program. Patient has been able to tolerate hour brisk walking. Patient has been using minimal to no compound of late. patient has been able to do their stationary cycling for about 10 miles. Has minimal radicular pain about the left anterior lower limb.   Interval Hx on Nov 08, 2023: presents for follow up. Since last visit, they underwent further diagnostic workup including additional imaging studies. Patient informed of all relevant imaging findings, all questions were answered including MRI of the lumbar spine from 10/30/2023 demonstrating grade 1 anterolisthesis of L5 on S1, L5/S1 B/L foraminal stenosis. mild right foraminal stenosis. There have been no significant changes to their aggravating or alleviating factors since the last visit. Pharmacologic treatments now include OTC analgesics PRN, otherwise pharmacologic treatments are minimal. Denies new or worsened numbness, tingling, or focal motor deficit. Denies interval fall, accident, or injury. Denies change in bowel or bladder functioning. patient describes increased exercises with their PT since their last visit. Patient believes their pain can interfere despite having found a therapist with whom they have developed better rapport of late at [facility] ([facility]). Patient notes having viral illness over the past few weeks. Patient has managed to walk about one hour at a time without pausing, some slight hip tightness after the longer walking. Writer and patient discussed the role of possible epidural injections. Patient will follow up with our practice as planned following last visit.  Interval Hx on Oct 17, 2023: presents for follow up. Since last visit, they have been unable to resume PT due to respiratory symptoms and are applying the topical compound cream. Patient plans on starting Wellbutrin for seasonal affective disorder with the guidance of their PCP. Patient has been having trouble with executive functioning including finding the motivation to participate in PT. Patient applying compound to the left gluteal region about the outer left posterior hip. Pain persists over the left gluteal region, left SI joint, radiates in an L5 distribution; MUCH less radiating pain, "not travelling as far" since their initial visit. Patient has been doing home stretching routine. Since the last visit, they relate improvements in pain previously associated with known exacerbating, aggravating factors and situations. Pharmacologic treatments now include OTC analgesics PRN, but otherwise pharmacologic treatments are minimal. Denies new or worsened numbness, tingling, or focal motor deficit. Denies interval fall, accident, or injury. Denies change in bowel or bladder functioning. When patient does increased exertion (most Fridays, Saturdays) they tend to have variable but worsened symptoms (severity varies).  Interval Hx on Sep 28, 2023: presents for follow up. Since last visit, they underwent further diagnostic workup including additional imaging studies. Patient informed of all relevant imaging findings, all questions were answered including L3 on L4, L4 on L5 with flexion extension. also with sacralization of L5. There have been no significant changes to their aggravating or alleviating factors since the last visit. Pharmacologic treatments now include OTC analgesics PRN, otherwise pharmacologic treatments are minimal. Denies new or worsened numbness, tingling, or focal motor deficit. Denies interval fall, accident, or injury. Denies change in bowel or bladder functioning. patient has been dealing with cough and flu-like symptoms, for over 10 days, tested negative for COVID and strep. patient attended PT for about one week, then was unable to go due to illness. patient has been having difficulty with attending PT due to having therapist leave the practice she was going to. Denies trouble breathing, some exertional shortness of breath. MRI lumbar spine without contrast is indicated given that the pt has not improved with tylenol, ibuprofen, naproxen, meloxicam, they obtained non-diagnostic radiographs with l3 on L4 and l4 on L5 dynamic instabilit, dynamic instability of the lumbar spine on radiographs, persistent pain, declining functional abilities, necessary for surgical treatments. This is medically necessary to outline targets for locally (interventional) directed treatments and/or guide surgical management. Patient will follow up with our practice as planned following last visit.  Initial Hx on 08/08/2023: Presents in person to Obey Heck. patient with left low back, left gluteal pain that extends into the left lower limb, crossing the knee into the lower limb in L5, S1 distribution. pain has disrupted her usual activities, so she has been using advil PRN, provides modest relief (70-80% better with meds). The patients difficulties began years ago, has recently been more symptomatic prior to presentation. has not yet enrolled in a physical therapy program unsure of the benefit at this time. The pain is graded as moderate to intermittently severe. The pain is described as burning pain at times. The pain is intermittent. The pain radiates in the LEFT LOWER limbs in a L5, S1 distribution. pain is worse during bridge movements, will usually make her pain worse. The patient feels that the pain is overall persistent. Patient denies other recent fall, MVA, injury, trauma, or accident besides presenting history above. Aggravating: hip bridges, every day exertion, at its worst there is "zero position thats good for it"Alleviating: rest, activity modification, pharmacologic treatments  Assoc Hx: Ambulates without assistive device Injection Hx: denies locally directed treatment to the area in question Imaging Hx: reviewed  Level of functioning: indep with ambulation, indep with ADLs Living Situation: dwelling with steps to enter

## 2024-04-13 NOTE — PHYSICAL EXAM
[FreeTextEntry1] : Gen: A+O x 3 in NAD Psych: labile ood and affect. Responds appropriately to commands. tearful at times Eyes: Anicteric. No discharge. EOMI. Resp: Breathing unlabored CV: DP pulses 2+ and equal. No varicosities noted Ext: No c/c/e Skin: No lesions noted  Gait: Non antalgic  +  reciprocating heel to toe able to stand on toes and heels WITH hand holding Tandem gait intact WITH hand holding Poor/Fair single leg standing balance, left Romberg negative  Neg Trendelenburg sign with single leg stance  Inspection: Spine alignment is midline. Palpation: There is + tenderness over the midline spinous processes, paravertebral muscles of the thoracolumbar region Lumbar ROM: Flexion, extension, side-bending, rotation, limited in most planes to the right >> left  persistent 2024 + pain with lateral flexion persistent 2024 + pain with oblique extension persistent 2024 + pain with lateral rotation   Hip ROM: neg pain at terminal ROM bilaterally. FAIR, FABERE negative bilaterally.  	Hip Flex       Knee Ext      Ankle Dorsi           EHL        Ankle Plantar Right	4/5	        5/5	                 5/5	          4/5	             5/5                            Left	        4/5	        5/5	                 5/5	          4/5	             5/5                             Hip abduction R 4+/5 L 4/5 Hip adduction R 4+/5 L 4/5 Hip extension R 4+/5 L 4/5 Knee Flexion R 4+/5 L4 /5  Tone: Normal. No clonus. Sensation: Grossly intact to light touch bilateral lower limbs. Proprioception: Intact at big toes bilaterally. Reflexes: 1+ symmetric knee jerk, ankle jerk.  Plantars downgoing bilaterally.  SLR negative bilaterally Crossed SLR negative bilaterally. Slump Test negative bilaterally  persistent 2024 prone gapping test + L>R persistent 2024 yeoman + L>R persistent 2024 nachlas + L>R active hip extension was more difficult on the left, knee extended more difficult  Antalgic Gait not present During double legged stance, weight bearing symmetrically Able to maintain double legged stance with eyes closed Able to maintain single legged stance on both lower limbs with eyes closed Neg gross shortening of either lower limb  RIGHT Hip: neg effusion neg warmth neg scars neg lesions no arthritic deformities of the right lower limb  Palpation: no TTP over the anterior iliac tubercules no TTP over the ASIS no TTP over the posterior iliac crest no TTP over the PSIS no TTP over the apex of the sacrum no TTP over the SI joint no TTP over the ischial tuberosity no TTP over the greater trochanter  AROM: active range of motion full and painless, within functional limits  PROM consistent with above  Manual Muscle Testin/5 in all planes with isometric resistance  Neg MORGAN Neg FADIR Neg Leg Roll Neg Stinchfield Neg Xiomara Neg Joya's Test (Distal ITB) Neg Maico  OA: neg limited active hip flexion with lateral hip pain neg painful active hip extension neg less than 25 degrees passive internal rotation of the hip neg pain with hip scouring maneuver neg pain with squatting/limited squat unable to maintain supine plank testing  Sensation intact to light touch over all aspects of the RIGHT hip  LEFT Hip: neg effusion neg warmth neg scars neg lesions no arthritic deformities of the right lower limb  Palpation: no TTP over the anterior iliac tubercules no TTP over the ASIS no TTP over the posterior iliac crest no TTP over the PSIS no TTP over the apex of the sacrum no TTP over the SI joint no TTP over the ischial tuberosity no TTP over the greater trochanter  AROM: active range of motion full and painless, within functional limits  Sagittal Plane Rotation: Flexion 110/110-120 Extension 5/10-15  Frontal Plane Rotation: Abduction 20/30-50 Adduction 20/30  Horizontal Plane Rotation: Internal Rotation 15/30-40 External Rotation 15/40-60  PROM consistent with above  Manual Muscle Testin/5 in all planes with isometric resistance  Neg MORGAN Neg FADIR Neg Leg Roll Neg Stinchfield Neg Xiomara Neg Joya's Test (Distal ITB) Neg Maico No pain, apprehension, or irregularity of movement with hip scour maneuver  OA: neg limited active hip flexion with lateral hip pain neg painful active hip extension neg less than 25 degrees passive internal rotation of the hip neg pain with hip scouring maneuver neg pain with squatting/limited squat unable to maintain supine plank testing  Sensation intact to light touch over all aspects of the LEFT hip

## 2024-04-15 ENCOUNTER — APPOINTMENT (OUTPATIENT)
Dept: PHYSICAL MEDICINE AND REHAB | Facility: CLINIC | Age: 59
End: 2024-04-15
Payer: MEDICAID

## 2024-04-15 VITALS
TEMPERATURE: 97.7 F | DIASTOLIC BLOOD PRESSURE: 75 MMHG | HEIGHT: 64 IN | OXYGEN SATURATION: 98 % | WEIGHT: 178 LBS | HEART RATE: 77 BPM | BODY MASS INDEX: 30.39 KG/M2 | SYSTOLIC BLOOD PRESSURE: 123 MMHG

## 2024-04-15 DIAGNOSIS — M43.17 SPONDYLOLISTHESIS, LUMBOSACRAL REGION: ICD-10-CM

## 2024-04-15 DIAGNOSIS — S42.032S DISPLACED FRACTURE OF LATERAL END OF LEFT CLAVICLE, SEQUELA: ICD-10-CM

## 2024-04-15 DIAGNOSIS — M85.80 OTHER SPECIFIED DISORDERS OF BONE DENSITY AND STRUCTURE, UNSPECIFIED SITE: ICD-10-CM

## 2024-04-15 PROCEDURE — 99215 OFFICE O/P EST HI 40 MIN: CPT

## 2024-04-15 PROCEDURE — G2211 COMPLEX E/M VISIT ADD ON: CPT | Mod: NC,1L

## 2024-04-15 RX ORDER — MELOXICAM 15 MG/1
15 TABLET ORAL
Qty: 10 | Refills: 0 | Status: ACTIVE | COMMUNITY
Start: 2024-04-15 | End: 1900-01-01

## 2024-04-17 ENCOUNTER — APPOINTMENT (OUTPATIENT)
Dept: ORTHOPEDIC SURGERY | Facility: CLINIC | Age: 59
End: 2024-04-17
Payer: MEDICAID

## 2024-04-17 VITALS
OXYGEN SATURATION: 96 % | WEIGHT: 178 LBS | HEIGHT: 72 IN | SYSTOLIC BLOOD PRESSURE: 126 MMHG | DIASTOLIC BLOOD PRESSURE: 82 MMHG | BODY MASS INDEX: 24.11 KG/M2 | HEART RATE: 87 BPM

## 2024-04-17 PROCEDURE — 99213 OFFICE O/P EST LOW 20 MIN: CPT

## 2024-04-18 RX ORDER — HYALURONATE SODIUM 20 MG/2 ML
20 SYRINGE (ML) INTRAARTICULAR
Qty: 2 | Refills: 0 | Status: ACTIVE | COMMUNITY
Start: 2024-04-18 | End: 1900-01-01

## 2024-04-21 ENCOUNTER — NON-APPOINTMENT (OUTPATIENT)
Age: 59
End: 2024-04-21

## 2024-04-22 ENCOUNTER — OUTPATIENT (OUTPATIENT)
Dept: OUTPATIENT SERVICES | Facility: HOSPITAL | Age: 59
LOS: 1 days | End: 2024-04-22

## 2024-04-22 ENCOUNTER — APPOINTMENT (OUTPATIENT)
Dept: RADIOLOGY | Facility: CLINIC | Age: 59
End: 2024-04-22
Payer: MEDICAID

## 2024-04-22 ENCOUNTER — APPOINTMENT (OUTPATIENT)
Dept: MRI IMAGING | Facility: CLINIC | Age: 59
End: 2024-04-22
Payer: MEDICAID

## 2024-04-22 PROCEDURE — 73221 MRI JOINT UPR EXTREM W/O DYE: CPT | Mod: 26,LT

## 2024-04-22 PROCEDURE — 77085 DXA BONE DENSITY AXL VRT FX: CPT | Mod: 26

## 2024-04-23 NOTE — HISTORY OF PRESENT ILLNESS
[de-identified] : 04/17/24: 57 y/o female following up for R > L knee OA. She first presented here in June 2023, at which time we began CSI and Euflexxa series. She reports that Euflexxa series in particular was quite helpful, though has now noted a recurrent grinding sensation in her knee. She is ambulating without the use of an assistive device.  06/07/2023: 56 y/o female referred by Dr. Bailey for evaluation of right knee OA. She reports a history of approximately 5-6 months of progressively worsening right knee pain after a relatively minor injury. She reports primarily medial joint line pain with a lesser focus of suprapatellar pain. Symptoms are exacerbated by any weightbearing and ambulation but particularly by ascending and descending stairs as well as squatting and kneeling. She denies any limitations or ambulatory tolerance and ambulates without an assistive device. 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 She notes that since she left that position, symptoms have been somewhat better, but she complains of inability to perform basic activities such as managing stairs and kneeling. She takes Advil daily as needed. Other conservative management measures attempted in the past include acupuncture, right knee aspiration and CSI by Dr. Bailey in February 2023, and PT at various time points. She has a history of left knee arthroscopy for a meniscal tear in 2006 by Dr. Bailey. She denies prior surgeries to the right knee.   Pain levels include a current pain level of 4/10 and pt states pain is sharp.  She states the symptoms seem to be constant.  Modifying factors - worsened by walking, worsened with knee flexion and worsened with knee extension. Relieving factors include relieved by nonsteroidal anti-inflammatory drugs.

## 2024-04-23 NOTE — END OF VISIT
[FreeTextEntry3] : All medical record entries made by the Scribe were at my, Dr. Yair Padilla, direction and personally dictated by me on 04/17/2024. I have reviewed the chart and agree that the record accurately reflects my personal performance of the history, physical exam, assessment and plan. I have also personally directed, reviewed, and agreed with the chart.

## 2024-04-23 NOTE — DISCUSSION/SUMMARY
[de-identified] : Imp: 59 y/o female with right > left knee osteoarthritis. - She responded particularly well to the last set of Euflexxa. I do not think we need to consider any surgical management at this time. - We had previously considered repeat series of Euflexxa, which were not requested in time to be available for today's visit. - We offered a right knee CSI today, which she declined - She will follow up with PMR and continue with her HEP in the meantime. Will administer next Euflexxa series once available. We reviewed that serial HA can be performed q6mo as needed.

## 2024-04-23 NOTE — ADDENDUM
[FreeTextEntry1] : I, Jacob Stock, documented this note as a scribe on behalf of Dr. Yair Padilla on 04/17/2024.

## 2024-04-23 NOTE — PHYSICAL EXAM
[de-identified] : General appearance: well nourished and hydrated, pleasant, alert and oriented x 3, cooperative.   Gait: no assistive device, stable nonantalgic gait pattern   Right knee: - Focal soft tissue swelling: none - Ecchymosis: none - Erythema: none - Effusion: trace - Wounds: none - Alignment: normal - Tenderness: mild medial and lateral tenderness to palpation , no pain or crepitus with patellar compression test - ROM:  0-140 - Collateral laxity: none - Cruciate laxity: none - Popliteal angle (degrees):  40 - Quad strength:  5/5  Limb lengths: clinically equal

## 2024-05-13 ENCOUNTER — APPOINTMENT (OUTPATIENT)
Dept: PHYSICAL MEDICINE AND REHAB | Facility: CLINIC | Age: 59
End: 2024-05-13
Payer: MEDICAID

## 2024-05-13 VITALS
HEART RATE: 77 BPM | HEIGHT: 71 IN | SYSTOLIC BLOOD PRESSURE: 123 MMHG | OXYGEN SATURATION: 97 % | WEIGHT: 178 LBS | TEMPERATURE: 97.7 F | DIASTOLIC BLOOD PRESSURE: 78 MMHG | BODY MASS INDEX: 24.92 KG/M2

## 2024-05-13 DIAGNOSIS — G25.89 OTHER SPECIFIED EXTRAPYRAMIDAL AND MOVEMENT DISORDERS: ICD-10-CM

## 2024-05-13 DIAGNOSIS — M75.42 IMPINGEMENT SYNDROME OF LEFT SHOULDER: ICD-10-CM

## 2024-05-13 DIAGNOSIS — M24.112 OTHER ARTICULAR CARTILAGE DISORDERS, LEFT SHOULDER: ICD-10-CM

## 2024-05-13 DIAGNOSIS — M47.27 OTHER SPONDYLOSIS WITH RADICULOPATHY, LUMBOSACRAL REGION: ICD-10-CM

## 2024-05-13 DIAGNOSIS — M25.312 OTHER INSTABILITY, LEFT SHOULDER: ICD-10-CM

## 2024-05-13 PROCEDURE — G2211 COMPLEX E/M VISIT ADD ON: CPT | Mod: NC,1L

## 2024-05-13 PROCEDURE — 99214 OFFICE O/P EST MOD 30 MIN: CPT

## 2024-05-13 RX ORDER — GABAPENTIN 100 MG/1
100 CAPSULE ORAL
Qty: 90 | Refills: 2 | Status: ACTIVE | COMMUNITY
Start: 2024-05-13 | End: 1900-01-01

## 2024-05-14 NOTE — HISTORY OF PRESENT ILLNESS
[FreeTextEntry1] : Jacob Santos M.D. Sports Medicine and Interventional Spine Department of Physical Medicine and Rehabilitation  [facility] [address] [place] [address]  [facility] [place] at [facility] [address] [place] [address]  For [place] Appointments Phone: [phone] Fax: [phone]  ----------------------------------------------------------------------------------------------------------------------------------------  PATIENT: FRANIKE NESBITT  MRN: [id number]  YOB: 1965  DATE OF SERVICE: May 13, 2024   May 13, 2024   Dear Drs.  Thank you for referring FRANKIE NESBITT to my Sports and Interventional Spine practice and office. Enclosed is a copy of the patient's consultation/progress note, which includes my complete assessment and recent studies completed during the patient's evaluation.  If you have questions or have any patients who require nonsurgical, non-opiate management of any sports, spine, or musculoskeletal conditions, please do not hesitate to contact my , Su Londono at [phone].  I look forward to taking care of your patients along with you.  Sincerely,  Jacob Santos MD Sports, Interventional Spine, & Regenerative Musculoskeletal Medicine [facility]                                                     Follow Up Visit CC: left leg pain  HPI:  This is a follow up visit to [facility] [place] at [facility].    FRANKIE NESBITT presents with the chief complaint as above.    Interval Hx on May 13, 2024: presents for follow up. Since last visit, had left shoulder MRI and DEXA scan. Patient informed of all relevant imaging findings, all questions were answered including osteoporosis in the lumbar spine, osteopenia in the hips and need for further primary specialist workup/treatments. patient also informed of their infraspinatus tear, mild subacromial bursitis, and biceps tendinosis. There have been no significant changes to their aggravating or alleviating factors since the last visit. patient had paused therapy pending these results. Since the last visit, they relate significant improvements in pain previously associated with known exacerbating, aggravating factors and situations. Pharmacologic treatments now include OTC analgesics PRN, but otherwise pharmacologic treatments are minimal. Denies new or worsened numbness, tingling, or focal motor deficit. Denies interval fall, accident, or injury. Denies change in bowel or bladder functioning.  Meds: denies regular PO pain medications, as above otherwise Therapy Program: no recent structured targeted therapy program HEP: doing HEP regularly  Assoc Sx: Denies numbness, Tingling Denies Focal motor weakness in the upper or lower limbs Denies New or worsened bowel or bladder incontinence Denies Saddle anesthesia Denies Using Orthotic(s)/Supportive devices Denies Swelling in the upper/lower extremities They also deny frequent tripping, falling  ROS: A 14 point review of systems was completed. Positive findings are pain as described above. The remaining systems negative.  Breast Cancer Surveillance: up to date COVID HX: reviewed  Interval Hx on Apr 15, 2024: presents for follow up. Since last visit, they underwent further diagnostic workup including additional imaging studies. Patient informed of all relevant imaging findings, all questions were answered including chronic distal third clavicle fracture without GH OA. patient cannot recall incident that would have caused the injury. patient notes pain with movements, overhead. transferring from sitting to standing is quite painful. patient notices pain with specific movements. There have been no significant changes to their aggravating or alleviating factors since the last visit. Pharmacologic treatments now include OTC analgesics PRN, otherwise pharmacologic treatments are minimal. Denies new or worsened numbness, tingling, or focal motor deficit. Denies interval fall, accident, or injury. Denies change in bowel or bladder functioning.  Interval Hx on Mar 25, 2024: presents for follow up. Since last visit, they had pain sensation associated with popping over the left shoulder. started one month ago. constant aching soreness over the left side. patient has noticed decreased ROM, no swelling, sligth "little bit" weakness in the left compared to the right. she demonstrates extension internal rotation as being quite limited, overhead movements. not trying topical or oral pharmacologic treatments. There have been no significant changes to their aggravating or alleviating factors since the last visit. patient has continued their stationary cycling, since the last visit. no previous injections to the left shoulder nor any recent imaging. Pharmacologic treatments now include OTC analgesics PRN, but otherwise pharmacologic treatments are minimal.  Denies new or worsened numbness, tingling, or focal motor deficit. Denies interval fall, accident, or injury. Denies change in bowel or bladder functioning.  Interval Hx on Jan 09, 2024: presents for follow up. Since last visit, patient's symptoms are overall better. patient has been doing more HEP since the last visit, researching further exercises. patient completed therapy program with [facility] at [facility]. patient has been doing home exercises 4-5 times per week. patient is feeling "effing great." patient has been able to tolerate standing for 8+ hours Friday and Saturday including mile walk to and from work; tolerating better than the last visit. There have been no significant changes to their aggravating or alleviating factors since the last visit. Since the last visit, they relate improvements in pain previously associated with known exacerbating, aggravating factors and situations. Pharmacologic treatments now include OTC analgesics PRN, but otherwise pharmacologic treatments are minimal. Denies new or worsened numbness, tingling, or focal motor deficit. Denies interval fall, accident, or injury. Denies change in bowel or bladder functioning. Patient notes improved motivation for continuing their therapy program. Patient has been able to tolerate hour brisk walking. Patient has been using minimal to no compound of late. patient has been able to do their stationary cycling for about 10 miles. Has minimal radicular pain about the left anterior lower limb.   Interval Hx on Nov 08, 2023: presents for follow up. Since last visit, they underwent further diagnostic workup including additional imaging studies. Patient informed of all relevant imaging findings, all questions were answered including MRI of the lumbar spine from 10/30/2023 demonstrating grade 1 anterolisthesis of L5 on S1, L5/S1 B/L foraminal stenosis. mild right foraminal stenosis. There have been no significant changes to their aggravating or alleviating factors since the last visit. Pharmacologic treatments now include OTC analgesics PRN, otherwise pharmacologic treatments are minimal. Denies new or worsened numbness, tingling, or focal motor deficit. Denies interval fall, accident, or injury. Denies change in bowel or bladder functioning. patient describes increased exercises with their PT since their last visit. Patient believes their pain can interfere despite having found a therapist with whom they have developed better rapport of late at [facility] ([facility]). Patient notes having viral illness over the past few weeks. Patient has managed to walk about one hour at a time without pausing, some slight hip tightness after the longer walking. Writer and patient discussed the role of possible epidural injections. Patient will follow up with our practice as planned following last visit.  Interval Hx on Oct 17, 2023: presents for follow up. Since last visit, they have been unable to resume PT due to respiratory symptoms and are applying the topical compound cream. Patient plans on starting Wellbutrin for seasonal affective disorder with the guidance of their PCP. Patient has been having trouble with executive functioning including finding the motivation to participate in PT. Patient applying compound to the left gluteal region about the outer left posterior hip. Pain persists over the left gluteal region, left SI joint, radiates in an L5 distribution; MUCH less radiating pain, "not travelling as far" since their initial visit. Patient has been doing home stretching routine. Since the last visit, they relate improvements in pain previously associated with known exacerbating, aggravating factors and situations. Pharmacologic treatments now include OTC analgesics PRN, but otherwise pharmacologic treatments are minimal. Denies new or worsened numbness, tingling, or focal motor deficit. Denies interval fall, accident, or injury. Denies change in bowel or bladder functioning. When patient does increased exertion (most Fridays, Saturdays) they tend to have variable but worsened symptoms (severity varies).  Interval Hx on Sep 28, 2023: presents for follow up. Since last visit, they underwent further diagnostic workup including additional imaging studies. Patient informed of all relevant imaging findings, all questions were answered including L3 on L4, L4 on L5 with flexion extension. also with sacralization of L5. There have been no significant changes to their aggravating or alleviating factors since the last visit. Pharmacologic treatments now include OTC analgesics PRN, otherwise pharmacologic treatments are minimal. Denies new or worsened numbness, tingling, or focal motor deficit. Denies interval fall, accident, or injury. Denies change in bowel or bladder functioning. patient has been dealing with cough and flu-like symptoms, for over 10 days, tested negative for COVID and strep. patient attended PT for about one week, then was unable to go due to illness. patient has been having difficulty with attending PT due to having therapist leave the practice she was going to. Denies trouble breathing, some exertional shortness of breath. MRI lumbar spine without contrast is indicated given that the pt has not improved with tylenol, ibuprofen, naproxen, meloxicam, they obtained non-diagnostic radiographs with l3 on L4 and l4 on L5 dynamic instabilit, dynamic instability of the lumbar spine on radiographs, persistent pain, declining functional abilities, necessary for surgical treatments. This is medically necessary to outline targets for locally (interventional) directed treatments and/or guide surgical management. Patient will follow up with our practice as planned following last visit.  Initial Hx on 08/08/2023: Presents in person to [facility]. patient with left low back, left gluteal pain that extends into the left lower limb, crossing the knee into the lower limb in L5, S1 distribution. pain has disrupted her usual activities, so she has been using advil PRN, provides modest relief (70-80% better with meds). The patients difficulties began years ago, has recently been more symptomatic prior to presentation. has not yet enrolled in a physical therapy program unsure of the benefit at this time. The pain is graded as moderate to intermittently severe. The pain is described as burning pain at times. The pain is intermittent. The pain radiates in the LEFT LOWER limbs in a L5, S1 distribution. pain is worse during bridge movements, will usually make her pain worse. The patient feels that the pain is overall persistent. Patient denies other recent fall, MVA, injury, trauma, or accident besides presenting history above. Aggravating: hip bridges, every day exertion, at its worst there is "zero position thats good for it"Alleviating: rest, activity modification, pharmacologic treatments  Assoc Hx: Ambulates without assistive device Injection Hx: denies locally directed treatment to the area in question Imaging Hx: reviewed  Level of functioning: indep with ambulation, indep with ADLs Living Situation: dwelling with steps to enter

## 2024-05-14 NOTE — ASSESSMENT
[FreeTextEntry1] : FRANKIE NESBITT is a 58 year female with left low back pain here for follow up   Biceps tenosynovitis, left Scapular dyskinesis, type 2, left SASD Bursitis, left Tendinopathy of rotator cuff, left > right  Degenerative scoliosis in adult Lumbar facet arthropathy  Gluteal tendinitis, L>R Sacroiliac joint dysfunction, left Gluteus medius tendinopathy, B/L  Sprain of sacroiliac joint region, L Chronic lumbar radiculopathy, L5, S1, left Spasm of lumbar paraspinous muscles, B/L  Osteoarthritis of knee, B/L  - Tiers of treatment and management of above diagnosis(es) were discussed with patient - Optimal diet, weight, sleep, and lifestyle management to minimize stress and maximize well being counseling provided - Imaging reviewed and discussed with patient - Reviewed previous encounter notes from 11/29/2023 Janie Lzooya, 7/26/2023 Dr. JOEL Padilla ([facility]) - Mar 25, 2024: advised to start PT for the left shoulder, 1-2 times per week for 6-8 weeks; new updated prescription offered May 13, 2024  - Patient was educated on an appropriate home exercise program, provided with exercise recommendations, all questions answered - patient may continue meloxicam per specialists, encouraged less consumption overall - Patient may trial acetaminophen 1000mg up to TID PRN moderate to severe pain and to decrease average consumption of NSAID - Patient may continue to trial topical compound cream to the left posterolateral hip no more than twice per day as needed for next 2-3 weeks, Rx entered via portal, written information provided to the patient in addition to verbal explanation regarding the medication - Mar 25, 2024: Patient may trial topical diclofenac 1% gel QID to the site of their worst pain for the next 7 to 10 days to help with pain and relieve inflammation. Afterwards, they were advised to use only as needed. - 10/17/2023: Writer and patient shared an extended conversation about risks, benefits, and alternatives to gabapentin, with the potential risk of depressed mood decision made to hold off for now; 01/09/2024 advised to continue to review medication information - Provided with contact information for Physiologic [yoga, pilates]  - Patient was advised to apply cool compresses or warm heat to affected regions PRN - Ordered radiographs of the lumbosacral, pelvic region 8/8/2023, reviewed with patient via TTM and in office extensively with hip angle measurements in office 10/17/2023; Mar 25, 2024 ordered radiographs for the left shoulder  - Patient encouraged to have consultation with Nutritionist given harder time of late with weight reduction, post COVID, referral placed 10/17/2023 and provided with contact information  - Educated about red flag symptoms including (but not limited to) new, worsened, or persistent: fever greater than 100F, bowel or bladder incontinence, bowel obstipation, inability to void urine, urinary leakage, Severe nausea or vomiting, Worsening numbness, worsening tingling/paresthesias, and/or new or progressive motor weakness; advised to seek immediate medical attention at his nearest Emergency department should they experience any of the above  - Follow up in person in 2 months to assess need for cevical/hip trigger point injections, SASDB CSI, assess progress with PT  I have personally spent a total of at least 45 minutes preparing, reviewing internal and external records, explaining, counseling, and coordinating care for this patient encounter.  Thank you, Dr(s), for allowing me to participate in the care of your patient. Please do not hesitate to contact me with questions/concerns.  Jacob Santos M.D. Sports and Interventional Spine Department of Physical Medicine and Rehabilitation [facility] Physician [facility] [address] [place] [address]  Appointments: [phone] Fax: [phone]

## 2024-05-14 NOTE — DATA REVIEWED
[FreeTextEntry1] : ACC: [id number]     EXAM:  XR SHOULDER COMP MIN 2V LT   ORDERED BY: KEVIN JOHNSON INTERPRETATION:  HISTORY: pain TECHNIQUE: 4 views of the left shoulder. COMPARISON: None IMPRESSION: Chronic-appearing malunited fracture deformity of the mid to distal third of the left clavicle with apex superior angulation. No acute appearing displaced fracture or dislocation. Mild acromioclavicular arthrosis. Glenohumeral joint space appears normal.  SITE PERFORMED: [facility] SITE PHONE: [phone] Patient: FRANKIE NESBITT YOB: 1965 Phone: [phone] MRN: [id number] Acc: [id number] Date of Exam: 10- EXAM: MRI LUMBAR SPINE WITHOUT CONTRAST HISTORY: Lower back pain. TECHNIQUE: Multiplanar, multi-sequential MRI of the lumbar spine was obtained on a 1.5T scanner using a standard protocol. COMPARISON: MRI lumbar spine 10/5/2016. FINDINGS: For purposes of this dictation, the last well-formed disc space will be labeled L5-S1. OSSEOUS STRUCTURES: Vertebral body heights are preserved. No marrow edema or destructive marrow infiltrative process. ALIGNMENT: There is levoscoliosis of the lumbar spine. Normal lumbar lordosis is preserved. Grade 1 anterolisthesis of L5-S1. SPINAL CORD AND CONUS MEDULLARIS: The conus medullaris terminates at L1. PARASPINAL AND INTRA-ABDOMINAL SOFT TISSUES: Unremarkable. INCLUDED THORACIC SPINE AND SACRUM: Unremarkable. DISCS: Multilevel disc desiccation. Disc heights are maintained. The following axial levels are imaged and detailed below: T12-L1: No disc bulging or herniation. No spinal canal or foraminal stenosis. L1-L2: No disc bulging or herniation. No spinal canal or foraminal stenosis. L2-L3: No disc bulging or herniation. No spinal canal or foraminal stenosis. L3-L4: No disc bulging or herniation. No spinal canal or foraminal stenosis. L4-L5: Mild right eccentric disc bulge with mild right foraminal stenosis. No spinal canal or left foraminal stenosis. Findings are stable. L5-S1: Grade 1 anterolisthesis of L5-S1, disc uncovering and facet arthrosis with moderate right and severe left foraminal stenosis with impingement of the exiting left L5 nerve. Severe left and moderate right facet arthrosis, which has progressed. No spinal canal stenosis. IMPRESSION: MRI of the lumbar spine demonstrates: Levoscoliosis of the lumbar spine. Grade 1 anterolisthesis of L5-S1 with severe left and moderate right facet arthrosis, which has progressed. At L4-5, mild right eccentric disc bulge with mild right foraminal stenosis. At L5-S1, severe left and moderate right foraminal stenosis with impingement of the exiting left L5 nerve.    Dieter NESBITTitte 1965 (58y) F Chart Update: 23-Aug-2023  Previous Next  Close XR PELVIS AP ONLY 1 OR 2 VIEWS  Results Results Hx Details Questions Add'l Details Charging Encounter Annotations Results:	  XR PELVIS AP ONLY 1 OR 2 VIEWS             Final  No Documents Attached    	 ACC: [id number]     EXAM:  XR PELVIS AP ONLY 1-2 VIEWS   ORDERED BY: KEVIN ELIAS  PROCEDURE DATE:  08/15/2023    INTERPRETATION:  INDICATION: Knee pain  TECHNIQUE: AP view of the pelvis  COMPARISON: 6/11/2023  FINDINGS:  There is no fracture or dislocation. The hip joint spaces and alignment are preserved.. Degenerative changes of the pubic symphysis. There is no focal soft tissue abnormality.   IMPRESSION:  No significant hip joint space narrowing.  Per my read, right center edge 28 degrees, left center edge 34 degrees; possibly due to scoliosis  --- End of Report ---      JAMES ARTEAGA MBA-VICTORINA GREENWOOD; Attending Radiologist This document has been electronically signed. Aug 18 2023 12:05PM  Ordered by: KEVIN ELIAS       Collected/Examined: [id number] 05:33PM       Verified by: KEVIN ELIAS [id number] 10:46AM       Result Communication: No patient communication needed at this time; Stage: Final       Performed at: [facility]       Resulted: 49Cyo8042 12:05PM       Last Updated: 23Aug2023 10:46AM       Accession: [id number]       Results Hx:	 Goal	[id number]	[id number] Item Name		05:33PM	11:24AM XR PELVIS AP ONLY 1 OR 2  VIEWS		Report 1	Report 2  * indicates comments or annotations. Hover * or Report to view full result. Right click on result to view in new window.  Report #1 - [id number] 05:33PM - XR PELVIS AP ONLY 1 OR 2 VIEWS ACC: [id number]     EXAM:  XR PELVIS AP ONLY 1-2 VIEWS   ORDERED BY: KEVIN ELIAS  PROCEDURE DATE:  08/15/2023    INTERPRETATION:  INDICATION: Knee pain  TECHNIQUE: AP view of the pelvis  COMPARISON: 6/11/2023  FINDINGS:  There is no fracture or dislocation. The hip joint spaces and alignment are preserved.. Degenerative changes of the pubic symphysis. There is no focal soft tissue abnormality.   IMPRESSION:  No significant hip joint space narrowing.  --- End of Report ---      JAMES METCALF MD; Attending Radiologist This document has been electronically signed. Aug 18 2023 12:05PM  Report #2 - [id number] 11:24AM - XR PELVIS AP ONLY 1 OR 2 VIEWS ACC: [id number]     EXAM:  XR PELVIS AP ONLY 1-2 VIEWS   ORDERED BY: JHONATAN GOMES  PROCEDURE DATE:  06/07/2023    INTERPRETATION:  INDICATION: Knee pain  TECHNIQUE: AP view of the pelvis  COMPARISON: None available  FINDINGS:  There is no fracture or dislocation. There are mild degenerative changes of both hips.  There is no focal soft tissue abnormality.   IMPRESSION:  Mild degenerative changes of both hips.  --- End of Report ---      JAMES METCALF MD; Attending Radiologist This document has been electronically signed. Jun 9 2023 11:35AM Details:	 Scheduled:	 Status:	Complete For:	 Recorded as History:	[id number] 10:46AM Overdue:	81Njv1918 12:00AM To Be Performed:	 Communicated By:	Recorded Priority:	Routine Ordered By:	KEVIN ELIAS Supervised By:	KEVIN ELIAS Managed By:	KEVIN ELIAS Authorization:	Not Required Performing Instructions:	 Patient Instructions:	 Order Instructions:	 Questions:	          (none) Add'l Details:	 Financial Auth:	 Authorization #:	 Appt. Status:	Appointment Not Needed Effective:	[id number] 12:09PM Expires:	52Hto5446 12:09PM Done:	90Grt3118 05:33PM Order #:	[id number] Requisition #:	[id number] Label Type:	 Collection:	Collection Specimen Identifier:	 ID:	 CPT:	 LOINC:	 SNOMED:	 Type:	 Charges:	None Will Be Collected in Office?	No Score:	0 NDS#:	 Content Source:	 Justification:	 View link item history	 Goals:	None Charging:	 Override Encounter Details: Special Billing:	 Account #:	 Injury Date:	8/18/2023 12:09:00 PM Description:	 Encounters:	 Creation:	[id number] Result Review KEVIN ELIAS (Physical Medicine and Rehab)  Collection:	None Specified Be Done By:	None Specified Scheduled:	None Specified Performed:	None Specified Charge :	None Specified Annotations:	          (none) Edit Audit Task Annotate Frankie Patricia 1965 (58y) F Chart Update: 23-Aug-2023  Previous Next  Close XR LUMBAR SPINE 4 VIEWS INCLUDING OBLIQUE  Results Results Hx Details Questions Add'l Details Charging Encounter Annotations Results:	  XR LUMBAR SPINE 4 VIEWS INCLUDING OBLIQUE             Final  No Documents Attached   Changed By [facility] - Reason: INCORRECT EXAM ORDERED  	 ACC: [id number]     EXAM:  XR LS SPINE W OBLIQUES MIN 4V   ORDERED BY: KEVIN ELIAS  PROCEDURE DATE:  08/15/2023    INTERPRETATION:  Clinical history/reason for exam:Low back pain and radiculopathy  Comparison: None.  Procedure: AP, lateral, flexion, extension, bilateral oblique views, and spot views of the lower lumbar spine (7 total views of the lumbar spine).  Findings: There are 4 nonrib-bearing lumbar-type vertebral bodies with sacralization of the L5 vertebral body. There is marked S-shaped scoliosis of the thoracic and lumbar spine. There is grade 1 anterolisthesis of L3 on L4 and L4 on L5 which is exaggerated on flexion and reduces on extension. The vertebral body heights are maintained. There is mild disc space narrowing throughout the lumbar spine.  Impression: Sacralization of the L5 vertebral body. Marked S-shaped scoliosis of the thoracic and lumbar spine. Grade 1 anterolisthesis L3-4 and L4-5 with motion on flexion-extension.  --- End of Report ---      TALHA CLARKE MD; Attending Radiologist This document has been electronically signed. Aug 18 2023  1:59PM  Ordered by: KEVIN ELIAS       Collected/Examined: [id number] 05:34PM       Verified by: KEVIN ELIAS 70Ckp0612 10:46AM       Result Communication: No patient communication needed at this time; Stage: Final       Performed at: [facility]       Resulted: 51Loz2596 01:54PM       Last Updated: 23Aug2023 10:46AM       Accession: [id number]       Results Hx:	 There are no additional results to show Details:	 Scheduled:	 Status:	Complete For:	 Recorded as History:	03Wku0836 10:46AM Overdue:	28Aug2023 12:00AM To Be Performed:	 Communicated By:	Recorded Priority:	Routine Ordered By:	KEVIN ELIAS Supervised By:	KEVIN ELIAS Managed By:	KEVIN ELIAS Authorization:	Not Required Performing Instructions:	 Patient Instructions:	 Order Instructions:	 Questions:	          (none) Add'l Details:	 Financial Auth:	 Authorization #:	 Appt. Status:	Appointment Not Needed Effective:	[id number] 02:02PM Expires:	18Aug2023 02:02PM Done:	70Qcp6799 05:34PM Order #:	[id number] Requisition #:	[id number] Label Type:	 Collection:	Collection Specimen Identifier:	 ID:	 CPT:	 LOINC:	 SNOMED:	 Type:	 Charges:	None Will Be Collected in Office?	No Score:	0 NDS#:	 Content Source:	 Justification:	 View link item history	 Goals:	None Charging:	 Override Encounter Details: Special Billing:	 Account #:	 Injury Date:	8/18/2023 2:02:30 PM Description:	 Encounters:	 Creation:	[id number] Result Review KEVIN ELIAS (Physical Medicine and Rehab)  Collection:	None Specified Be Done By:	None Specified Scheduled:	None Specified Performed:	None Specified Charge :	None Specified Annotations:	          (none) Edit Audit Task Annotate QVerify

## 2024-06-16 NOTE — DATA REVIEWED
[FreeTextEntry1] : ACC: [id number]     EXAM:  XR SHOULDER COMP MIN 2V LT   ORDERED BY: KEVIN JOHNSON INTERPRETATION:  HISTORY: pain TECHNIQUE: 4 views of the left shoulder. COMPARISON: None IMPRESSION: Chronic-appearing malunited fracture deformity of the mid to distal third of the left clavicle with apex superior angulation. No acute appearing displaced fracture or dislocation. Mild acromioclavicular arthrosis. Glenohumeral joint space appears normal.  SITE PERFORMED: [facility] SITE PHONE: [phone] Patient: FRANKIE NESBITT YOB: 1965 Phone: [phone] MRN: [id number] Acc: [id number] Date of Exam: 10- EXAM: MRI LUMBAR SPINE WITHOUT CONTRAST HISTORY: Lower back pain. TECHNIQUE: Multiplanar, multi-sequential MRI of the lumbar spine was obtained on a 1.5T scanner using a standard protocol. COMPARISON: MRI lumbar spine 10/5/2016. FINDINGS: For purposes of this dictation, the last well-formed disc space will be labeled L5-S1. OSSEOUS STRUCTURES: Vertebral body heights are preserved. No marrow edema or destructive marrow infiltrative process. ALIGNMENT: There is levoscoliosis of the lumbar spine. Normal lumbar lordosis is preserved. Grade 1 anterolisthesis of L5-S1. SPINAL CORD AND CONUS MEDULLARIS: The conus medullaris terminates at L1. PARASPINAL AND INTRA-ABDOMINAL SOFT TISSUES: Unremarkable. INCLUDED THORACIC SPINE AND SACRUM: Unremarkable. DISCS: Multilevel disc desiccation. Disc heights are maintained. The following axial levels are imaged and detailed below: T12-L1: No disc bulging or herniation. No spinal canal or foraminal stenosis. L1-L2: No disc bulging or herniation. No spinal canal or foraminal stenosis. L2-L3: No disc bulging or herniation. No spinal canal or foraminal stenosis. L3-L4: No disc bulging or herniation. No spinal canal or foraminal stenosis. L4-L5: Mild right eccentric disc bulge with mild right foraminal stenosis. No spinal canal or left foraminal stenosis. Findings are stable. L5-S1: Grade 1 anterolisthesis of L5-S1, disc uncovering and facet arthrosis with moderate right and severe left foraminal stenosis with impingement of the exiting left L5 nerve. Severe left and moderate right facet arthrosis, which has progressed. No spinal canal stenosis. IMPRESSION: MRI of the lumbar spine demonstrates: Levoscoliosis of the lumbar spine. Grade 1 anterolisthesis of L5-S1 with severe left and moderate right facet arthrosis, which has progressed. At L4-5, mild right eccentric disc bulge with mild right foraminal stenosis. At L5-S1, severe left and moderate right foraminal stenosis with impingement of the exiting left L5 nerve.    Dieter NESBITTitte 1965 (58y) F Chart Update: 23-Aug-2023  Previous Next  Close XR PELVIS AP ONLY 1 OR 2 VIEWS  Results Results Hx Details Questions Add'l Details Charging Encounter Annotations Results:	  XR PELVIS AP ONLY 1 OR 2 VIEWS             Final  No Documents Attached    	 ACC: [id number]     EXAM:  XR PELVIS AP ONLY 1-2 VIEWS   ORDERED BY: KEVIN ELIAS  PROCEDURE DATE:  08/15/2023    INTERPRETATION:  INDICATION: Knee pain  TECHNIQUE: AP view of the pelvis  COMPARISON: 6/11/2023  FINDINGS:  There is no fracture or dislocation. The hip joint spaces and alignment are preserved.. Degenerative changes of the pubic symphysis. There is no focal soft tissue abnormality.   IMPRESSION:  No significant hip joint space narrowing.  Per my read, right center edge 28 degrees, left center edge 34 degrees; possibly due to scoliosis  --- End of Report ---      JAMES ARTEAGA MBA-VICTORINA GREENWOOD; Attending Radiologist This document has been electronically signed. Aug 18 2023 12:05PM  Ordered by: KEVIN ELIAS       Collected/Examined: 37Hnc7071 05:33PM       Verified by: KEVIN ELIAS 25Taj1032 10:46AM       Result Communication: No patient communication needed at this time; Stage: Final       Performed at: [facility]       Resulted: 76Sxi8880 12:05PM       Last Updated: 23Aug2023 10:46AM       Accession: [id number]       Results Hx:	 Goal	[id number]	[id number] Item Name		05:33PM	11:24AM XR PELVIS AP ONLY 1 OR 2  VIEWS		Report 1	Report 2  * indicates comments or annotations. Hover * or Report to view full result. Right click on result to view in new window.  Report #1 - [id number] 05:33PM - XR PELVIS AP ONLY 1 OR 2 VIEWS ACC: [id number]     EXAM:  XR PELVIS AP ONLY 1-2 VIEWS   ORDERED BY: KEVIN ELIAS  PROCEDURE DATE:  08/15/2023    INTERPRETATION:  INDICATION: Knee pain  TECHNIQUE: AP view of the pelvis  COMPARISON: 6/11/2023  FINDINGS:  There is no fracture or dislocation. The hip joint spaces and alignment are preserved.. Degenerative changes of the pubic symphysis. There is no focal soft tissue abnormality.   IMPRESSION:  No significant hip joint space narrowing.  --- End of Report ---      JAMES METCALF MD; Attending Radiologist This document has been electronically signed. Aug 18 2023 12:05PM  Report #2 - [id number] 11:24AM - XR PELVIS AP ONLY 1 OR 2 VIEWS ACC: [id number]     EXAM:  XR PELVIS AP ONLY 1-2 VIEWS   ORDERED BY: JHONATAN GOMES  PROCEDURE DATE:  06/07/2023    INTERPRETATION:  INDICATION: Knee pain  TECHNIQUE: AP view of the pelvis  COMPARISON: None available  FINDINGS:  There is no fracture or dislocation. There are mild degenerative changes of both hips.  There is no focal soft tissue abnormality.   IMPRESSION:  Mild degenerative changes of both hips.  --- End of Report ---      JAMES METCALF MD; Attending Radiologist This document has been electronically signed. Jun 9 2023 11:35AM Details:	 Scheduled:	 Status:	Complete For:	 Recorded as History:	60Gul6664 10:46AM Overdue:	34Keg2994 12:00AM To Be Performed:	 Communicated By:	Recorded Priority:	Routine Ordered By:	KEVIN ELIAS Supervised By:	KEVIN ELIAS Managed By:	KEVIN ELIAS Authorization:	Not Required Performing Instructions:	 Patient Instructions:	 Order Instructions:	 Questions:	          (none) Add'l Details:	 Financial Auth:	 Authorization #:	 Appt. Status:	Appointment Not Needed Effective:	14Gcq2960 12:09PM Expires:	13Bqk5371 12:09PM Done:	49Jkw3043 05:33PM Order #:	[id number] Requisition #:	[id number] Label Type:	 Collection:	Collection Specimen Identifier:	 ID:	 CPT:	 LOINC:	 SNOMED:	 Type:	 Charges:	None Will Be Collected in Office?	No Score:	0 NDS#:	 Content Source:	 Justification:	 View link item history	 Goals:	None Charging:	 Override Encounter Details: Special Billing:	 Account #:	 Injury Date:	8/18/2023 12:09:00 PM Description:	 Encounters:	 Creation:	[id number] Result Review KEVIN ELIAS (Physical Medicine and Rehab)  Collection:	None Specified Be Done By:	None Specified Scheduled:	None Specified Performed:	None Specified Charge :	None Specified Annotations:	          (none) Edit Audit Task Annotate Frankie Patricia 1965 (58y) F Chart Update: 23-Aug-2023  Previous Next  Close XR LUMBAR SPINE 4 VIEWS INCLUDING OBLIQUE  Results Results Hx Details Questions Add'l Details Charging Encounter Annotations Results:	  XR LUMBAR SPINE 4 VIEWS INCLUDING OBLIQUE             Final  No Documents Attached   Changed By [facility] - Reason: INCORRECT EXAM ORDERED  	 ACC: [id number]     EXAM:  XR LS SPINE W OBLIQUES MIN 4V   ORDERED BY: KEVIN ELIAS  PROCEDURE DATE:  08/15/2023    INTERPRETATION:  Clinical history/reason for exam:Low back pain and radiculopathy  Comparison: None.  Procedure: AP, lateral, flexion, extension, bilateral oblique views, and spot views of the lower lumbar spine (7 total views of the lumbar spine).  Findings: There are 4 nonrib-bearing lumbar-type vertebral bodies with sacralization of the L5 vertebral body. There is marked S-shaped scoliosis of the thoracic and lumbar spine. There is grade 1 anterolisthesis of L3 on L4 and L4 on L5 which is exaggerated on flexion and reduces on extension. The vertebral body heights are maintained. There is mild disc space narrowing throughout the lumbar spine.  Impression: Sacralization of the L5 vertebral body. Marked S-shaped scoliosis of the thoracic and lumbar spine. Grade 1 anterolisthesis L3-4 and L4-5 with motion on flexion-extension.  --- End of Report ---      TALHA CLARKE MD; Attending Radiologist This document has been electronically signed. Aug 18 2023  1:59PM  Ordered by: KEVIN ELIAS       Collected/Examined: 93Qte8894 05:34PM       Verified by: KEVIN ELIAS 61Xll6347 10:46AM       Result Communication: No patient communication needed at this time; Stage: Final       Performed at: [facility]       Resulted: [id number] 01:54PM       Last Updated: 23Aug2023 10:46AM       Accession: [id number]       Results Hx:	 There are no additional results to show Details:	 Scheduled:	 Status:	Complete For:	 Recorded as History:	94Smh3531 10:46AM Overdue:	28Aug2023 12:00AM To Be Performed:	 Communicated By:	Recorded Priority:	Routine Ordered By:	KEVIN ELIAS Supervised By:	KEVIN ELIAS Managed By:	KEVIN ELIAS Authorization:	Not Required Performing Instructions:	 Patient Instructions:	 Order Instructions:	 Questions:	          (none) Add'l Details:	 Financial Auth:	 Authorization #:	 Appt. Status:	Appointment Not Needed Effective:	[id number] 02:02PM Expires:	18Aug2023 02:02PM Done:	60Dft0084 05:34PM Order #:	[id number] Requisition #:	[id number] Label Type:	 Collection:	Collection Specimen Identifier:	 ID:	 CPT:	 LOINC:	 SNOMED:	 Type:	 Charges:	None Will Be Collected in Office?	No Score:	0 NDS#:	 Content Source:	 Justification:	 View link item history	 Goals:	None Charging:	 Override Encounter Details: Special Billing:	 Account #:	 Injury Date:	8/18/2023 2:02:30 PM Description:	 Encounters:	 Creation:	[id number] Result Review KEVIN ELIAS (Physical Medicine and Rehab)  Collection:	None Specified Be Done By:	None Specified Scheduled:	None Specified Performed:	None Specified Charge :	None Specified Annotations:	          (none) Edit Audit Task Annotate QVerify

## 2024-06-16 NOTE — ASSESSMENT
[FreeTextEntry1] : FRANKIE NESBITT is a 58 year female with left low back pain here for follow up   Degenerative scoliosis in adult Lumbar facet arthropathy  Gluteal tendinitis, L>R Sacroiliac joint dysfunction, left Gluteus medius tendinopathy, B/L  Sprain of sacroiliac joint region, L Chronic lumbar radiculopathy, L5, S1, left Spasm of lumbar paraspinous muscles, B/L  Osteoarthritis of knee, B/L  - Tiers of treatment and management of above diagnosis(es) were discussed with patient - Optimal diet, weight, sleep, and lifestyle management to minimize stress and maximize well being counseling provided - Imaging reviewed and discussed with patient - Reviewed previous encounter notes from 11/29/2023 Janie Lozoya, 7/26/2023 Dr. JOEL Padilla ([facility]) - Mar 25, 2024: advised to start PT for the left shoulder, 1-2 times per week for 6-8 weeks - Patient was educated on an appropriate home exercise program, provided with exercise recommendations, all questions answered - patient may continue meloxicam per specialists, encouraged less consumption overall; short 5 day course sent for the patient on Apr 15, 2024  - Patient may trial acetaminophen 1000mg up to TID PRN moderate to severe pain and to decrease average consumption of NSAID - Patient may continue to trial topical compound cream to the left posterolateral hip no more than twice per day as needed for next 2-3 weeks, Rx entered via portal, written information provided to the patient in addition to verbal explanation regarding the medication - Mar 25, 2024: Patient may trial topical diclofenac 1% gel QID to the site of their worst pain for the next 7 to 10 days to help with pain and relieve inflammation. Afterwards, they were advised to use only as needed. - 10/17/2023: Writer and patient shared an extended conversation about risks, benefits, and alternatives to gabapentin, with the potential risk of depressed mood decision made to hold off for now; 01/09/2024 advised to continue to review medication information - Provided with contact information for Physiologic [yoga, pilates]  - Patient was advised to apply cool compresses or warm heat to affected regions PRN - Ordered radiographs of the lumbosacral, pelvic region 8/8/2023, reviewed with patient via TTM and in office extensively with hip angle measurements in office 10/17/2023; Mar 25, 2024 ordered radiographs for the left shoulder  - Patient encouraged to have consultation with Nutritionist given harder time of late with weight reduction, post COVID, referral placed 10/17/2023 and provided with contact information  - Educated about red flag symptoms including (but not limited to) new, worsened, or persistent: fever greater than 100F, bowel or bladder incontinence, bowel obstipation, inability to void urine, urinary leakage, Severe nausea or vomiting, Worsening numbness, worsening tingling/paresthesias, and/or new or progressive motor weakness; advised to seek immediate medical attention at his nearest Emergency department should they experience any of the above  - 04/15/2024:  MRI left shoulder without contrast is indicated given that the pt has not improved with tylenol, ibuprofen, naproxen, meloxicam, they obtained non-diagnostic radiographs with chornic appearing fracture of the mid clavicle, chronic appearing fracture on radiographs, persistent pain and decreased ROM with weeks/months of PT at [facility], necessary for surguical planning local treatments, and physical therapy/home exercise program>6 weeks. Patient's imaging is medically necessary to outline targets for locally (interventional) directed treatments and/or guide surgical management.  - Follow up in 6 weeks to review imaging, assess progress for PT and reassess need for hip trigger point injections vs SASDB CSI  I have personally spent a total of at least 45 minutes preparing, reviewing internal and external records, explaining, counseling, and coordinating care for this patient encounter.  Thank you, Dr(s), for allowing me to participate in the care of your patient. Please do not hesitate to contact me with questions/concerns.  Jacob Santos M.D. Sports and Interventional Spine Department of Physical Medicine and Rehabilitation [facility] [address] [place] [address]  Appointments: [phone] Fax: [phone]

## 2024-06-16 NOTE — HISTORY OF PRESENT ILLNESS
[FreeTextEntry1] : Jacob Santos M.D. Sports Medicine and Interventional Spine Department of Physical Medicine and Rehabilitation  [facility] [address] [place] [address]  [facility] [place] [facility] [address] [place] [address]  For [place] Appointments Phone: [phone] Fax: [phone]  ----------------------------------------------------------------------------------------------------------------------------------------  PATIENT: FRANKIE NESBITT  MRN: [id number]  YOB: 1965  DATE OF SERVICE: Apr 15, 2024   Apr 15, 2024   Dear Drs.  Thank you for referring FRANKIE NESBITT to my Sports and Interventional Spine practice and office. Enclosed is a copy of the patient's consultation/progress note, which includes my complete assessment and recent studies completed during the patient's evaluation.  If you have questions or have any patients who require nonsurgical, non-opiate management of any sports, spine, or musculoskeletal conditions, please do not hesitate to contact my , Su Londono at [phone].  I look forward to taking care of your patients along with you.  Sincerely,  Jacob Santos MD Sports, Interventional Spine, & Regenerative Musculoskeletal Medicine [facility]                                                     Follow Up Visit CC: left leg pain  HPI:  This is a follow up visit to [facility] [place] [facility].    FRANKIE NESBITT presents with the chief complaint as above.    Interval Hx on Apr 15, 2024: presents for follow up. Since last visit, they underwent further diagnostic workup including additional imaging studies. Patient informed of all relevant imaging findings, all questions were answered including chronic distal third clavicle fracture without GH OA. patient cannot recall incident that would have caused the injury. patient notes pain with movements, overhead. transferring from sitting to standing is quite painful. patient notices pain with specific movements. There have been no significant changes to their aggravating or alleviating factors since the last visit. Since the last visit, they relate significant improvements in pain previously associated with known exacerbating, aggravating factors and situations. Pharmacologic treatments now include OTC analgesics PRN, otherwise pharmacologic treatments are minimal. Denies new or worsened numbness, tingling, or focal motor deficit. Denies interval fall, accident, or injury. Denies change in bowel or bladder functioning.  Meds: denies regular PO pain medications, as above otherwise Therapy Program: no recent structured targeted therapy program HEP: doing HEP regularly  Assoc Sx: Denies numbness, Tingling Denies Focal motor weakness in the upper or lower limbs Denies New or worsened bowel or bladder incontinence Denies Saddle anesthesia Denies Using Orthotic(s)/Supportive devices Denies Swelling in the upper/lower extremities They also deny frequent tripping, falling  ROS: A 14 point review of systems was completed. Positive findings are pain as described above. The remaining systems negative.  Breast Cancer Surveillance: up to date COVID HX: reviewed  Interval Hx on Mar 25, 2024: presents for follow up. Since last visit, they had pain sensation associated with poppiing over the left shoulder. started one month ago. constant aching soreness over the left side. patient has noticed decreased ROM, no swelling, sligth "little bit" weakness in the left compared to the right. she demonstrates extension internal rotation as being quite limited, overhead movements. not trying topical or oral pharmacologic treatments. There have been no significant changes to their aggravating or alleviating factors since the last visit. patient has continued their stationary cycling, since the last visit. no previous injections to the left shoulder nor any recent imaging. Pharmacologic treatments now include OTC analgesics PRN, but otherwise pharmacologic treatments are minimal.  Denies new or worsened numbness, tingling, or focal motor deficit. Denies interval fall, accident, or injury. Denies change in bowel or bladder functioning.  Interval Hx on Jan 09, 2024: presents for follow up. Since last visit, patient's symptoms are overall better. patient has been doing more HEP since the last visit, researching further exercises. patient completed therapy program with [facility] at [facility]. patient has been doing home exercises 4-5 times per week. patient is feeling "effing great." patient has been able to tolerate standing for 8+ hours Friday and Saturday including mile walk to and from work; tolerating better than the last visit. There have been no significant changes to their aggravating or alleviating factors since the last visit. Since the last visit, they relate improvements in pain previously associated with known exacerbating, aggravating factors and situations. Pharmacologic treatments now include OTC analgesics PRN, but otherwise pharmacologic treatments are minimal. Denies new or worsened numbness, tingling, or focal motor deficit. Denies interval fall, accident, or injury. Denies change in bowel or bladder functioning. Patient notes improved motivation for continuing their therapy program. Patient has been able to tolerate hour brisk walking. Patient has been using minimal to no compound of late. patient has been able to do their stationary cycling for about 10 miles. Has minimal radicular pain about the left anterior lower limb.   Interval Hx on Nov 08, 2023: presents for follow up. Since last visit, they underwent further diagnostic workup including additional imaging studies. Patient informed of all relevant imaging findings, all questions were answered including MRI of the lumbar spine from 10/30/2023 demonstrating grade 1 anterolisthesis of L5 on S1, L5/S1 B/L foraminal stenosis. mild right foraminal stenosis. There have been no significant changes to their aggravating or alleviating factors since the last visit. Pharmacologic treatments now include OTC analgesics PRN, otherwise pharmacologic treatments are minimal. Denies new or worsened numbness, tingling, or focal motor deficit. Denies interval fall, accident, or injury. Denies change in bowel or bladder functioning. patient describes increased exercises with their PT since their last visit. Patient believes their pain can interfere despite having found a therapist with whom they have developed better rapport of late at [facility] ([facility]). Patient notes having viral illness over the past few weeks. Patient has managed to walk about one hour at a time without pausing, some slight hip tightness after the longer walking. Writer and patient discussed the role of possible epidural injections. Patient will follow up with our practice as planned following last visit.  Interval Hx on Oct 17, 2023: presents for follow up. Since last visit, they have been unable to resume PT due to respiratory symptoms and are applying the topical compound cream. Patient plans on starting Wellbutrin for seasonal affective disorder with the guidance of their PCP. Patient has been having trouble with executive functioning including finding the motivation to participate in PT. Patient applying compound to the left gluteal region about the outer left posterior hip. Pain persists over the left gluteal region, left SI joint, radiates in an L5 distribution; MUCH less radiating pain, "not travelling as far" since their initial visit. Patient has been doing home stretching routine. Since the last visit, they relate improvements in pain previously associated with known exacerbating, aggravating factors and situations. Pharmacologic treatments now include OTC analgesics PRN, but otherwise pharmacologic treatments are minimal. Denies new or worsened numbness, tingling, or focal motor deficit. Denies interval fall, accident, or injury. Denies change in bowel or bladder functioning. When patient does increased exertion (most Fridays, Saturdays) they tend to have variable but worsened symptoms (severity varies).  Interval Hx on Sep 28, 2023: presents for follow up. Since last visit, they underwent further diagnostic workup including additional imaging studies. Patient informed of all relevant imaging findings, all questions were answered including L3 on L4, L4 on L5 with flexion extension. also with sacralization of L5. There have been no significant changes to their aggravating or alleviating factors since the last visit. Pharmacologic treatments now include OTC analgesics PRN, otherwise pharmacologic treatments are minimal. Denies new or worsened numbness, tingling, or focal motor deficit. Denies interval fall, accident, or injury. Denies change in bowel or bladder functioning. patient has been dealing with cough and flu-like symptoms, for over 10 days, tested negative for COVID and strep. patient attended PT for about one week, then was unable to go due to illness. patient has been having difficulty with attending PT due to having therapist leave the practice she was going to. Denies trouble breathing, some exertional shortness of breath. MRI lumbar spine without contrast is indicated given that the pt has not improved with tylenol, ibuprofen, naproxen, meloxicam, they obtained non-diagnostic radiographs with l3 on L4 and l4 on L5 dynamic instabilit, dynamic instability of the lumbar spine on radiographs, persistent pain, declining functional abilities, necessary for surgical treatments. This is medically necessary to outline targets for locally (interventional) directed treatments and/or guide surgical management. Patient will follow up with our practice as planned following last visit.  Initial Hx on 08/08/2023: Presents in person to Obey Heck. patient with left low back, left gluteal pain that extends into the left lower limb, crossing the knee into the lower limb in L5, S1 distribution. pain has disrupted her usual activities, so she has been using advil PRN, provides modest relief (70-80% better with meds). The patients difficulties began years ago, has recently been more symptomatic prior to presentation. has not yet enrolled in a physical therapy program unsure of the benefit at this time. The pain is graded as moderate to intermittently severe. The pain is described as burning pain at times. The pain is intermittent. The pain radiates in the LEFT LOWER limbs in a L5, S1 distribution. pain is worse during bridge movements, will usually make her pain worse. The patient feels that the pain is overall persistent. Patient denies other recent fall, MVA, injury, trauma, or accident besides presenting history above. Aggravating: hip bridges, every day exertion, at its worst there is "zero position thats good for it"Alleviating: rest, activity modification, pharmacologic treatments  Assoc Hx: Ambulates without assistive device Injection Hx: denies locally directed treatment to the area in question Imaging Hx: reviewed  Level of functioning: indep with ambulation, indep with ADLs Living Situation: dwelling with steps to enter

## 2024-06-24 ENCOUNTER — RESULT REVIEW (OUTPATIENT)
Age: 59
End: 2024-06-24

## 2024-06-24 ENCOUNTER — APPOINTMENT (OUTPATIENT)
Dept: ORTHOPEDIC SURGERY | Facility: CLINIC | Age: 59
End: 2024-06-24
Payer: MEDICAID

## 2024-06-24 ENCOUNTER — OUTPATIENT (OUTPATIENT)
Dept: OUTPATIENT SERVICES | Facility: HOSPITAL | Age: 59
LOS: 1 days | End: 2024-06-24
Payer: MEDICAID

## 2024-06-24 VITALS
OXYGEN SATURATION: 97 % | SYSTOLIC BLOOD PRESSURE: 116 MMHG | DIASTOLIC BLOOD PRESSURE: 80 MMHG | BODY MASS INDEX: 27.31 KG/M2 | WEIGHT: 160 LBS | HEART RATE: 85 BPM | HEIGHT: 64 IN

## 2024-06-24 PROCEDURE — 20610 DRAIN/INJ JOINT/BURSA W/O US: CPT | Mod: RT

## 2024-06-24 PROCEDURE — 73564 X-RAY EXAM KNEE 4 OR MORE: CPT

## 2024-06-24 PROCEDURE — 73564 X-RAY EXAM KNEE 4 OR MORE: CPT | Mod: 26,50

## 2024-07-01 ENCOUNTER — APPOINTMENT (OUTPATIENT)
Dept: ORTHOPEDIC SURGERY | Facility: CLINIC | Age: 59
End: 2024-07-01
Payer: MEDICAID

## 2024-07-01 VITALS
DIASTOLIC BLOOD PRESSURE: 84 MMHG | WEIGHT: 160 LBS | OXYGEN SATURATION: 97 % | HEART RATE: 78 BPM | BODY MASS INDEX: 27.31 KG/M2 | HEIGHT: 64 IN | SYSTOLIC BLOOD PRESSURE: 125 MMHG

## 2024-07-01 PROBLEM — M17.0 PRIMARY OSTEOARTHRITIS OF KNEES, BILATERAL: Status: ACTIVE | Noted: 2023-06-07

## 2024-07-01 PROCEDURE — 20610 DRAIN/INJ JOINT/BURSA W/O US: CPT | Mod: RT

## 2024-07-08 ENCOUNTER — APPOINTMENT (OUTPATIENT)
Dept: ORTHOPEDIC SURGERY | Facility: CLINIC | Age: 59
End: 2024-07-08
Payer: MEDICAID

## 2024-07-08 VITALS
HEART RATE: 76 BPM | OXYGEN SATURATION: 96 % | WEIGHT: 160 LBS | BODY MASS INDEX: 27.31 KG/M2 | SYSTOLIC BLOOD PRESSURE: 131 MMHG | DIASTOLIC BLOOD PRESSURE: 81 MMHG | HEIGHT: 64 IN

## 2024-07-08 DIAGNOSIS — M17.0 BILATERAL PRIMARY OSTEOARTHRITIS OF KNEE: ICD-10-CM

## 2024-07-08 PROCEDURE — 20610 DRAIN/INJ JOINT/BURSA W/O US: CPT | Mod: RT

## 2024-08-13 ENCOUNTER — APPOINTMENT (OUTPATIENT)
Dept: ORTHOPEDIC SURGERY | Facility: CLINIC | Age: 59
End: 2024-08-13

## 2024-08-13 VITALS
BODY MASS INDEX: 27.31 KG/M2 | WEIGHT: 160 LBS | HEIGHT: 64 IN | SYSTOLIC BLOOD PRESSURE: 122 MMHG | HEART RATE: 81 BPM | OXYGEN SATURATION: 97 % | DIASTOLIC BLOOD PRESSURE: 85 MMHG

## 2024-08-13 PROCEDURE — 20610 DRAIN/INJ JOINT/BURSA W/O US: CPT | Mod: RT

## 2024-09-24 ENCOUNTER — APPOINTMENT (OUTPATIENT)
Dept: PHYSICAL MEDICINE AND REHAB | Facility: CLINIC | Age: 59
End: 2024-09-24

## 2024-11-12 ENCOUNTER — APPOINTMENT (OUTPATIENT)
Dept: ORTHOPEDIC SURGERY | Facility: CLINIC | Age: 59
End: 2024-11-12

## 2025-02-10 ENCOUNTER — APPOINTMENT (OUTPATIENT)
Dept: ORTHOPEDIC SURGERY | Facility: CLINIC | Age: 60
End: 2025-02-10
Payer: MEDICAID

## 2025-02-10 ENCOUNTER — NON-APPOINTMENT (OUTPATIENT)
Age: 60
End: 2025-02-10

## 2025-02-10 VITALS
OXYGEN SATURATION: 97 % | BODY MASS INDEX: 28.16 KG/M2 | DIASTOLIC BLOOD PRESSURE: 81 MMHG | SYSTOLIC BLOOD PRESSURE: 115 MMHG | HEIGHT: 65 IN | HEART RATE: 75 BPM | WEIGHT: 169 LBS

## 2025-02-10 PROCEDURE — 20610 DRAIN/INJ JOINT/BURSA W/O US: CPT | Mod: RT

## 2025-02-12 ENCOUNTER — APPOINTMENT (OUTPATIENT)
Dept: ORTHOPEDIC SURGERY | Facility: CLINIC | Age: 60
End: 2025-02-12
Payer: MEDICAID

## 2025-02-12 VITALS
BODY MASS INDEX: 28.16 KG/M2 | HEART RATE: 74 BPM | WEIGHT: 169 LBS | SYSTOLIC BLOOD PRESSURE: 107 MMHG | OXYGEN SATURATION: 96 % | DIASTOLIC BLOOD PRESSURE: 73 MMHG | HEIGHT: 65 IN

## 2025-02-12 DIAGNOSIS — M71.21 SYNOVIAL CYST OF POPLITEAL SPACE [BAKER], RIGHT KNEE: ICD-10-CM

## 2025-02-12 PROCEDURE — 99213 OFFICE O/P EST LOW 20 MIN: CPT

## 2025-02-18 ENCOUNTER — APPOINTMENT (OUTPATIENT)
Dept: ORTHOPEDIC SURGERY | Facility: CLINIC | Age: 60
End: 2025-02-18
Payer: MEDICAID

## 2025-02-18 DIAGNOSIS — M17.0 BILATERAL PRIMARY OSTEOARTHRITIS OF KNEE: ICD-10-CM

## 2025-02-18 PROCEDURE — 20610 DRAIN/INJ JOINT/BURSA W/O US: CPT | Mod: RT

## 2025-02-25 ENCOUNTER — APPOINTMENT (OUTPATIENT)
Dept: ORTHOPEDIC SURGERY | Facility: CLINIC | Age: 60
End: 2025-02-25
Payer: MEDICAID

## 2025-02-25 VITALS
HEART RATE: 71 BPM | WEIGHT: 169 LBS | BODY MASS INDEX: 28.16 KG/M2 | OXYGEN SATURATION: 96 % | DIASTOLIC BLOOD PRESSURE: 85 MMHG | HEIGHT: 65 IN | SYSTOLIC BLOOD PRESSURE: 124 MMHG

## 2025-02-25 DIAGNOSIS — M17.0 BILATERAL PRIMARY OSTEOARTHRITIS OF KNEE: ICD-10-CM

## 2025-02-25 PROCEDURE — 20610 DRAIN/INJ JOINT/BURSA W/O US: CPT | Mod: RT
